# Patient Record
Sex: FEMALE | Race: WHITE | URBAN - METROPOLITAN AREA
[De-identification: names, ages, dates, MRNs, and addresses within clinical notes are randomized per-mention and may not be internally consistent; named-entity substitution may affect disease eponyms.]

---

## 2017-01-14 DIAGNOSIS — E03.9 ACQUIRED HYPOTHYROIDISM: Primary | Chronic | ICD-10-CM

## 2017-01-17 RX ORDER — LEVOTHYROXINE SODIUM 88 MCG
TABLET ORAL
Qty: 30 TABLET | Refills: 0 | Status: SHIPPED | OUTPATIENT
Start: 2017-01-17 | End: 2017-02-15

## 2017-01-17 NOTE — TELEPHONE ENCOUNTER
Patients last labs on 11/16/16, Thyroid dose lowered  Too repeat labs in 8 weeks  Due now to repeat labs

## 2017-02-06 ENCOUNTER — TELEPHONE (OUTPATIENT)
Dept: FAMILY MEDICINE CLINIC | Facility: CLINIC | Age: 62
End: 2017-02-06

## 2017-02-06 ENCOUNTER — HOSPITAL ENCOUNTER (OUTPATIENT)
Dept: GENERAL RADIOLOGY | Age: 62
Discharge: HOME | End: 2017-02-06
Attending: FAMILY MEDICINE
Payer: COMMERCIAL

## 2017-02-06 ENCOUNTER — OFFICE VISIT (OUTPATIENT)
Dept: FAMILY MEDICINE CLINIC | Facility: CLINIC | Age: 62
End: 2017-02-06

## 2017-02-06 VITALS
WEIGHT: 163 LBS | SYSTOLIC BLOOD PRESSURE: 118 MMHG | HEIGHT: 65 IN | RESPIRATION RATE: 16 BRPM | HEART RATE: 64 BPM | TEMPERATURE: 98 F | BODY MASS INDEX: 27.16 KG/M2 | DIASTOLIC BLOOD PRESSURE: 76 MMHG

## 2017-02-06 DIAGNOSIS — J01.01 ACUTE RECURRENT MAXILLARY SINUSITIS: ICD-10-CM

## 2017-02-06 DIAGNOSIS — E89.0 HX OF THYROIDECTOMY: ICD-10-CM

## 2017-02-06 DIAGNOSIS — D84.9 IMMUNOCOMPROMISED (HCC): ICD-10-CM

## 2017-02-06 DIAGNOSIS — J20.9 ACUTE BRONCHITIS WITH BRONCHOSPASM: ICD-10-CM

## 2017-02-06 DIAGNOSIS — B96.89 ACUTE BACTERIAL PHARYNGITIS: ICD-10-CM

## 2017-02-06 DIAGNOSIS — J02.8 ACUTE BACTERIAL PHARYNGITIS: ICD-10-CM

## 2017-02-06 DIAGNOSIS — D05.12 DUCTAL CARCINOMA IN SITU (DCIS) OF LEFT BREAST: ICD-10-CM

## 2017-02-06 DIAGNOSIS — J20.9 ACUTE BRONCHITIS WITH BRONCHOSPASM: Primary | ICD-10-CM

## 2017-02-06 PROCEDURE — 71020 XR CHEST PA + LAT CHEST (CPT=71020): CPT

## 2017-02-06 PROCEDURE — 99213 OFFICE O/P EST LOW 20 MIN: CPT | Performed by: FAMILY MEDICINE

## 2017-02-06 RX ORDER — AZITHROMYCIN 250 MG/1
TABLET, FILM COATED ORAL
Qty: 6 TABLET | Refills: 1 | Status: SHIPPED | OUTPATIENT
Start: 2017-02-06 | End: 2017-05-16 | Stop reason: ALTCHOICE

## 2017-02-06 RX ORDER — METHYLPREDNISOLONE 4 MG/1
TABLET ORAL
Qty: 1 KIT | Refills: 0 | Status: SHIPPED | OUTPATIENT
Start: 2017-02-06 | End: 2017-02-08 | Stop reason: SINTOL

## 2017-02-06 NOTE — PROGRESS NOTES
Pt here for sick visit today. 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 She states was sick in December with sinus infection and got better and got sick again now.  Fe 98 °F (36.7 °C)  Resp 16  Ht 65\"  Wt 163 lb  BMI 27.12 kg/m2  GENERAL: well developed, well nourished,in no apparent distress  SKIN: no rashes,no suspicious lesions  EYES:PERRLA, EOMI, normal optic disk,conjunctiva are clear  HEENT: atraumatic, normocepha management  -     XR CHEST PA + LAT CHEST (CPT=71020); Future  -     azithromycin (ZITHROMAX Z-CRISSY) 250 MG Oral Tab; Take two tablets by mouth today, then one tablet daily. -     methylPREDNISolone (MEDROL) 4 MG Oral Tablet Therapy Pack; As directed.     H

## 2017-02-06 NOTE — PROGRESS NOTES
Quick Note:    Please call pt with STAT chest xray result--no pneumonia but she has clinical sinus infection and bronchitis and advise she complete the ZPack and Medrol Dosepack as ordered- Dr. Annette Brody  ______

## 2017-02-06 NOTE — TELEPHONE ENCOUNTER
Stat chest xray notice received and resulted as normal and message left for staff to call pt.  Dr. Tootie Griffin

## 2017-02-06 NOTE — PATIENT INSTRUCTIONS
Dena you were seen for sinus infection and bronchitis --start the Zpack as directed with a the Medrol Dosepack as directed. Rest and hydrate. Use Tylenol as needed for fevers or headaches.    Take care, Dr. Irish Kemp heal bronchial tubes. Most of the time, acute bronchitis is caused by a viral infection. Antibiotics are usually not prescribed for viral infections. · Drink plenty of fluids, such as water, juice, or warm soup. Fluids loosen mucus so that you can cough it up. mucus and other fluids. Tiny hairlike cilia cover the mucosa. Cilia help carry mucus toward the opening of the sinus. Too much mucus may cause the cilia to stop working. This blocks the sinus opening.  A buildup of fluid in the sinuses then causes pain and

## 2017-02-08 ENCOUNTER — TELEPHONE (OUTPATIENT)
Dept: FAMILY MEDICINE CLINIC | Facility: CLINIC | Age: 62
End: 2017-02-08

## 2017-02-08 NOTE — TELEPHONE ENCOUNTER
Started the Z-pack and Medrol dose pack on Monday evening, noticed her face getting red and flushed, no itching or hives. She thinks it is from the steroid, she has taken a Z-pack in the past with no problems.  She did not take the prednisone today and want

## 2017-02-08 NOTE — TELEPHONE ENCOUNTER
Call pt and advise the red face is from the steroids and not the Zpack. She is to finish the Zpack. Steroids due cause flushing. She can hold the steroid and switch to Ibuprofen to help with inflammation instead.  Dr. Matthew Pacheco

## 2017-02-15 RX ORDER — LEVOTHYROXINE SODIUM 88 MCG
TABLET ORAL
Qty: 15 TABLET | Refills: 0 | Status: SHIPPED | OUTPATIENT
Start: 2017-02-15 | End: 2017-03-05

## 2017-03-06 NOTE — TELEPHONE ENCOUNTER
Patient is over  due for labs, please call patient to have her labs completed for thyroid before we can give out any more refills on medication.

## 2017-03-08 ENCOUNTER — APPOINTMENT (OUTPATIENT)
Dept: LAB | Age: 62
End: 2017-03-08
Attending: FAMILY MEDICINE
Payer: COMMERCIAL

## 2017-03-08 DIAGNOSIS — E03.9 ACQUIRED HYPOTHYROIDISM: Chronic | ICD-10-CM

## 2017-03-08 LAB
ANTI-THYROGLOBULIN: 16 U/ML (ref ?–60)
ANTI-THYROPEROXIDASE: <28 U/ML (ref ?–60)
FREE T4: 1.1 NG/DL (ref 0.9–1.8)
TSI SER-ACNC: 1.03 MIU/ML (ref 0.35–5.5)

## 2017-03-08 PROCEDURE — 84443 ASSAY THYROID STIM HORMONE: CPT

## 2017-03-08 PROCEDURE — 86376 MICROSOMAL ANTIBODY EACH: CPT

## 2017-03-08 PROCEDURE — 36415 COLL VENOUS BLD VENIPUNCTURE: CPT

## 2017-03-08 PROCEDURE — 84439 ASSAY OF FREE THYROXINE: CPT

## 2017-03-08 PROCEDURE — 86800 THYROGLOBULIN ANTIBODY: CPT

## 2017-03-09 RX ORDER — LEVOTHYROXINE SODIUM 88 MCG
TABLET ORAL
Qty: 90 TABLET | Refills: 1 | Status: SHIPPED | OUTPATIENT
Start: 2017-03-09 | End: 2017-05-16

## 2017-03-09 NOTE — PROGRESS NOTES
Quick Note:    Please call pt with normal results of thyroid labs and thyroid antibodies and repeat as directed in 6 months--- Dr. Andrew Lozano  ______

## 2017-05-16 ENCOUNTER — OFFICE VISIT (OUTPATIENT)
Dept: FAMILY MEDICINE CLINIC | Facility: CLINIC | Age: 62
End: 2017-05-16

## 2017-05-16 VITALS
BODY MASS INDEX: 26.89 KG/M2 | DIASTOLIC BLOOD PRESSURE: 82 MMHG | RESPIRATION RATE: 14 BRPM | HEIGHT: 65 IN | SYSTOLIC BLOOD PRESSURE: 126 MMHG | HEART RATE: 68 BPM | TEMPERATURE: 98 F | WEIGHT: 161.38 LBS

## 2017-05-16 DIAGNOSIS — E03.9 ACQUIRED HYPOTHYROIDISM: Primary | Chronic | ICD-10-CM

## 2017-05-16 DIAGNOSIS — Z76.0 MEDICATION REFILL: ICD-10-CM

## 2017-05-16 DIAGNOSIS — G43.709 CHRONIC MIGRAINE WITHOUT AURA WITHOUT STATUS MIGRAINOSUS, NOT INTRACTABLE: ICD-10-CM

## 2017-05-16 DIAGNOSIS — E89.0 HX OF THYROIDECTOMY: ICD-10-CM

## 2017-05-16 PROCEDURE — 99213 OFFICE O/P EST LOW 20 MIN: CPT | Performed by: FAMILY MEDICINE

## 2017-05-16 RX ORDER — ELETRIPTAN HYDROBROMIDE 40 MG/1
40 TABLET, FILM COATED ORAL DAILY
Qty: 27 TABLET | Refills: 3 | Status: SHIPPED | OUTPATIENT
Start: 2017-05-16 | End: 2017-11-14

## 2017-05-16 RX ORDER — LEVOTHYROXINE SODIUM 88 MCG
TABLET ORAL
Qty: 90 TABLET | Refills: 1 | Status: SHIPPED | OUTPATIENT
Start: 2017-05-16 | End: 2017-11-14

## 2017-05-16 NOTE — PATIENT INSTRUCTIONS
Rebeca Olivarez you were seen for thyroid and migraine management. Return for CPE and you are due for colonoscopy and possible colo cards. Thyroid labs due 9/2017. Stay on current meds. Take care, Dr. Deborah Hidalgo    Hypothyroidism       You have hypothyroidism.  Ceasar Carr calcium or antacids within 4 hours of taking your thyroid hormone pills.   · Don’t take other medicines with your thyroid hormone pill without checking with your provider first.  · Tell your provider if you have any side effects from your medicines that bot

## 2017-05-16 NOTE — PROGRESS NOTES
Berto Schulz is here for management of secondary hypothyroidism with hx of partial thyroidectomy and also for migraines. She needs meds refilled.  She also let me know she just went to [facility] 5/8/17 and had her mammogram done and normal. She brought in the no (Oral)  Resp 14  Ht 65\"  Wt 161 lb 6.4 oz  BMI 26.86 kg/m2  GENERAL: well developed, well nourished,in no apparent distress, very pleasant white female  SKIN: no rashes,no suspicious lesions  HEENT: atraumatic, normocephalic,ears and throat are clear  NEC

## 2017-07-17 ENCOUNTER — HOSPITAL ENCOUNTER (OUTPATIENT)
Dept: GENERAL RADIOLOGY | Age: 62
Discharge: HOME | End: 2017-07-17
Attending: FAMILY MEDICINE
Payer: COMMERCIAL

## 2017-07-17 ENCOUNTER — OFFICE VISIT (OUTPATIENT)
Dept: FAMILY MEDICINE CLINIC | Facility: CLINIC | Age: 62
End: 2017-07-17

## 2017-07-17 VITALS
OXYGEN SATURATION: 99 % | HEART RATE: 60 BPM | RESPIRATION RATE: 16 BRPM | DIASTOLIC BLOOD PRESSURE: 76 MMHG | SYSTOLIC BLOOD PRESSURE: 114 MMHG | BODY MASS INDEX: 26.82 KG/M2 | HEIGHT: 65 IN | WEIGHT: 161 LBS

## 2017-07-17 DIAGNOSIS — L60.8 BLACK NAILS: ICD-10-CM

## 2017-07-17 DIAGNOSIS — R23.2 HOT FLASHES: ICD-10-CM

## 2017-07-17 DIAGNOSIS — M76.892 TENDONITIS INVOLVING LEFT HIP ABDUCTORS: ICD-10-CM

## 2017-07-17 DIAGNOSIS — S69.91XS: ICD-10-CM

## 2017-07-17 DIAGNOSIS — M25.552 ACUTE HIP PAIN, LEFT: ICD-10-CM

## 2017-07-17 DIAGNOSIS — G62.9 PERIPHERAL POLYNEUROPATHY: ICD-10-CM

## 2017-07-17 DIAGNOSIS — F41.0 PANIC DISORDER: ICD-10-CM

## 2017-07-17 DIAGNOSIS — M25.552 ACUTE HIP PAIN, LEFT: Primary | ICD-10-CM

## 2017-07-17 DIAGNOSIS — Z51.81 ENCOUNTER FOR THERAPEUTIC DRUG MONITORING: ICD-10-CM

## 2017-07-17 DIAGNOSIS — D05.12 DUCTAL CARCINOMA IN SITU (DCIS) OF LEFT BREAST: ICD-10-CM

## 2017-07-17 PROCEDURE — 99214 OFFICE O/P EST MOD 30 MIN: CPT | Performed by: FAMILY MEDICINE

## 2017-07-17 PROCEDURE — 73502 X-RAY EXAM HIP UNI 2-3 VIEWS: CPT | Performed by: FAMILY MEDICINE

## 2017-07-17 RX ORDER — METHYLPREDNISOLONE 4 MG/1
TABLET ORAL
Qty: 1 KIT | Refills: 0 | Status: SHIPPED | OUTPATIENT
Start: 2017-07-17 | End: 2017-11-14 | Stop reason: ALTCHOICE

## 2017-07-17 RX ORDER — ALPRAZOLAM 0.25 MG/1
0.25 TABLET ORAL NIGHTLY PRN
Qty: 90 TABLET | Refills: 0 | Status: SHIPPED | OUTPATIENT
Start: 2017-07-17 | End: 2018-11-16

## 2017-07-17 NOTE — PATIENT INSTRUCTIONS
Sherryle Griffiths you were seen for left hip pain and tendonitis after travel and hiking. Try the Medrol Dosepack to help symptoms. Start the PT to help and get xrays done of left hip and pelvis as well.     Regarding the panic and hot flashes you can use your Xanax priti pain.  Follow-up care  Follow up with your healthcare provider, or as advised. If your symptoms do not begin to get better after a week, more tests may be needed. If X-rays were taken, you will be told of any new findings that may affect your care.   When do during a panic attack  · Remind yourself that your body is having a false alarm. Nothing bad will happen to you. You’ve survived attacks before, and you will this time, too. · Try not to think frightening thoughts about what might happen.  You won’t die breath or smothering  · Feelings of choking  · Chest pain or discomfort  · Nausea or abdominal distress  · Feeling dizzy, unsteady, light-headed, or faint  · Numbness or tingling sensations  · Fear of dying  · Fear of going crazy or of losing control  · Fe certain techniques that are helpful: relaxation and breathing exercises, visualization, biofeedback, meditation or simply taking some time-out to clear your mind.  For more information about this, ask your doctor or go to a local bookstore and review the ma

## 2017-07-17 NOTE — PROGRESS NOTES
Raphael Tenorio is a 63year old female. HPI:   Patient is here with a list of multiple issues today. #1.   She states she recently hiked the [facility] June 14-17 and when got home noticed left hip pains and now cannot adduct left leg and adrián GENERAL HEALTH: Patient here to address multiple issues today as noted above.   See HPI for details  SKIN: denies any unusual skin lesions or rashes; patient describes a rotten lateral right thumbnail since her chemotherapy that will not heal and she wish currently stable  PSYCHE: Patient is not depressed PHQ 2 score is 0. Instead, she is anxious and has had panic recently.   Xanax 0.25 mg advise daily and a new prescription will be issued    ASSESSMENT AND PLAN:   Trenton Mckenna was seen for multiple issues as polyneuropathy (HCC)  -     XR HIP + PELVIS MIN 4 VIEWS LEFT (CPT=73503); Future  Patient states she has had a long standing history of polyneuropathy in her extremities. She may need to see neurology in the future.   Currently we will evaluate her left hi

## 2017-07-19 ENCOUNTER — TELEPHONE (OUTPATIENT)
Dept: FAMILY MEDICINE CLINIC | Facility: CLINIC | Age: 62
End: 2017-07-19

## 2017-07-19 ENCOUNTER — OFFICE VISIT (OUTPATIENT)
Dept: PHYSICAL THERAPY | Age: 62
End: 2017-07-19
Attending: FAMILY MEDICINE
Payer: COMMERCIAL

## 2017-07-19 DIAGNOSIS — M25.552 ACUTE HIP PAIN, LEFT: ICD-10-CM

## 2017-07-19 DIAGNOSIS — M76.892 TENDONITIS INVOLVING LEFT HIP ABDUCTORS: ICD-10-CM

## 2017-07-19 DIAGNOSIS — G62.9 PERIPHERAL POLYNEUROPATHY: ICD-10-CM

## 2017-07-19 PROCEDURE — 97162 PT EVAL MOD COMPLEX 30 MIN: CPT

## 2017-07-19 PROCEDURE — 97110 THERAPEUTIC EXERCISES: CPT

## 2017-07-19 NOTE — TELEPHONE ENCOUNTER
Called autumn and clarified PT order for more of na orthopedic hip pain. Pt is scheduled for an evaluation tomorrow.

## 2017-07-19 NOTE — TELEPHONE ENCOUNTER
Saray Charles calling to see if patient should be seen at physical therapy for [facility] or orthopaedic. Calling to see if there is a pelvic floor component for patient to work on.  Please rebecca foster for PT.

## 2017-07-19 NOTE — PROGRESS NOTES
LOWER EXTREMITY EVALUATION:   Referring Physician: Dr. Paras Ibarra  Diagnosis: Acute pain L hip, tendinitis L hip     Date of Service: 7/19/2017     PATIENT Haydee Triplett is a 63year old y/o female who presents to therapy today with complaint SLR R and L     Balance: SLS: R 20 sec, L 25 sec    Today’s Treatment and Response: Instructed hooklying hip flexion, BKFO, quad sets, hip IR and ER reps as tolerated. Discussed findings and plan of care.  Patient was advised that there should be no lastin certify the need for these services furnished under this plan of treatment and while under my care.     X___________________________________________________ Date____________________    Certification From: 3/58/4736  To:10/17/2017

## 2017-07-21 NOTE — PROGRESS NOTES
Please call pt with normal results of her xray of her left  hip and pelvis with mild osteoarthritis seen and no metastatic concerns with her breast cancer history.  She should do the physical therapy as ordered --  Dr. Tylor Romero

## 2017-07-24 ENCOUNTER — OFFICE VISIT (OUTPATIENT)
Dept: PHYSICAL THERAPY | Age: 62
End: 2017-07-24
Attending: FAMILY MEDICINE
Payer: COMMERCIAL

## 2017-07-24 PROCEDURE — 97140 MANUAL THERAPY 1/> REGIONS: CPT

## 2017-07-24 PROCEDURE — 97110 THERAPEUTIC EXERCISES: CPT

## 2017-07-24 NOTE — PROGRESS NOTES
Dx: Acute pain L hip, L hip tendinitis         Authorized # of Visits: Will see for 12 visits         Next MD visit: none scheduled  Fall Risk: standard         Precautions: n/a             Subjective: L hip is getting  pain from L hip to groin.  Pain leve min  Total Treatment Time: 45 min

## 2017-07-26 ENCOUNTER — OFFICE VISIT (OUTPATIENT)
Dept: PHYSICAL THERAPY | Age: 62
End: 2017-07-26
Attending: FAMILY MEDICINE
Payer: COMMERCIAL

## 2017-07-26 PROCEDURE — 97140 MANUAL THERAPY 1/> REGIONS: CPT

## 2017-07-26 PROCEDURE — 97110 THERAPEUTIC EXERCISES: CPT

## 2017-07-26 NOTE — PROGRESS NOTES
Dx: Acute pain L hip, L hip tendinitis         Authorized # of Visits: Will see for 12 visits         Next MD visit: none scheduled  Fall Risk: standard         Precautions: n/a             Subjective: L hip was sore after treatment.  Better now and has re ability to walk with decreased discomfort     Charges:  Thera EX 2 (25 min) Man The 1 (13 min)       Total Timed Treatment: 38 min  Total Treatment Time: 45 min

## 2017-08-07 ENCOUNTER — OFFICE VISIT (OUTPATIENT)
Dept: PHYSICAL THERAPY | Age: 62
End: 2017-08-07
Attending: FAMILY MEDICINE
Payer: COMMERCIAL

## 2017-08-07 PROCEDURE — 97140 MANUAL THERAPY 1/> REGIONS: CPT

## 2017-08-07 PROCEDURE — 97110 THERAPEUTIC EXERCISES: CPT

## 2017-08-07 NOTE — PROGRESS NOTES
Dx: Acute pain L hip, L hip tendinitis         Authorized # of Visits: Will see for 12 visits         Next MD visit: none scheduled  Fall Risk: standard         Precautions: n/a             Subjective: Patient states that she is doing a little bit better. mobilization all planes 10 min        STM L anterior hip muscles 3 min Instructed with hip flexor stretch and Ladan stretch 3 min HEP                Skilled Services: hip mobilization to increase pain-free hip ROM for LE dressing, balance and strengthening

## 2017-08-09 ENCOUNTER — OFFICE VISIT (OUTPATIENT)
Dept: PHYSICAL THERAPY | Age: 62
End: 2017-08-09
Attending: FAMILY MEDICINE
Payer: COMMERCIAL

## 2017-08-09 PROCEDURE — 97140 MANUAL THERAPY 1/> REGIONS: CPT

## 2017-08-09 PROCEDURE — 97110 THERAPEUTIC EXERCISES: CPT

## 2017-08-09 NOTE — PROGRESS NOTES
Dx: Acute pain L hip, L hip tendinitis         Authorized # of Visits: Will see for 12 visits         Next MD visit: none scheduled  Fall Risk: standard         Precautions: n/a             Subjective: Patient states that she continues to feel better.  Sta walk F/B/L 3X each black tband       DKTCS with ball 10X DKTCS with ball 10X L hip mobilization all planes 13 min Bridging with hams curls 10X      L hip mobilization all planes 10 min L hip mobilization all planes 10 min  L hip mobilization all planes 13

## 2017-08-14 ENCOUNTER — OFFICE VISIT (OUTPATIENT)
Dept: PHYSICAL THERAPY | Age: 62
End: 2017-08-14
Attending: FAMILY MEDICINE
Payer: COMMERCIAL

## 2017-08-14 PROCEDURE — 97110 THERAPEUTIC EXERCISES: CPT

## 2017-08-14 NOTE — PROGRESS NOTES
Dx: Acute pain L hip, L hip tendinitis         Authorized # of Visits:   Will see for 12 visits         Next MD visit: none scheduled  Fall Risk: standard         Precautions: n/a           Physical therapy discharge summary:   Subjective: Patient states th balance 10X SLS on airex balance 10X eyes closed SLS on airex balance 10X eyes closed SLS on airex balance 10X eyes closed   ASU BOSU 20X Standing hip 3 ways 30X black theraband Standing hip 3 ways 20X black theraband Standing hip 3 ways 20X black theraban

## 2017-08-16 ENCOUNTER — APPOINTMENT (OUTPATIENT)
Dept: PHYSICAL THERAPY | Age: 62
End: 2017-08-16
Payer: COMMERCIAL

## 2017-10-19 ENCOUNTER — LAB ENCOUNTER (OUTPATIENT)
Dept: LAB | Age: 62
End: 2017-10-19
Attending: FAMILY MEDICINE
Payer: COMMERCIAL

## 2017-10-19 DIAGNOSIS — E03.9 ACQUIRED HYPOTHYROIDISM: Chronic | ICD-10-CM

## 2017-10-19 PROCEDURE — 84439 ASSAY OF FREE THYROXINE: CPT

## 2017-10-19 PROCEDURE — 84443 ASSAY THYROID STIM HORMONE: CPT

## 2017-10-19 PROCEDURE — 36415 COLL VENOUS BLD VENIPUNCTURE: CPT

## 2017-11-02 NOTE — PROGRESS NOTES
Please call pt with normal results of thyroid labs and stay on current dose of thyroid meds and repeat as directed these labs in 6 months ---  Dr. Thompson Going

## 2017-11-03 DIAGNOSIS — E03.9 ACQUIRED HYPOTHYROIDISM: Primary | Chronic | ICD-10-CM

## 2017-11-14 ENCOUNTER — OFFICE VISIT (OUTPATIENT)
Dept: FAMILY MEDICINE CLINIC | Facility: CLINIC | Age: 62
End: 2017-11-14

## 2017-11-14 VITALS
WEIGHT: 151 LBS | DIASTOLIC BLOOD PRESSURE: 74 MMHG | BODY MASS INDEX: 25.16 KG/M2 | SYSTOLIC BLOOD PRESSURE: 116 MMHG | OXYGEN SATURATION: 99 % | HEART RATE: 76 BPM | HEIGHT: 65 IN | RESPIRATION RATE: 16 BRPM | TEMPERATURE: 98 F

## 2017-11-14 DIAGNOSIS — Z76.0 MEDICATION REFILL: ICD-10-CM

## 2017-11-14 DIAGNOSIS — E03.9 ACQUIRED HYPOTHYROIDISM: Chronic | ICD-10-CM

## 2017-11-14 DIAGNOSIS — Z23 NEED FOR DIPHTHERIA-TETANUS-PERTUSSIS (TDAP) VACCINE: ICD-10-CM

## 2017-11-14 DIAGNOSIS — E89.0 HX OF THYROIDECTOMY: ICD-10-CM

## 2017-11-14 DIAGNOSIS — Z23 NEEDS FLU SHOT: ICD-10-CM

## 2017-11-14 DIAGNOSIS — Z12.11 COLON CANCER SCREENING: ICD-10-CM

## 2017-11-14 DIAGNOSIS — G43.709 CHRONIC MIGRAINE WITHOUT AURA WITHOUT STATUS MIGRAINOSUS, NOT INTRACTABLE: ICD-10-CM

## 2017-11-14 DIAGNOSIS — Z13.31 DEPRESSION SCREEN: ICD-10-CM

## 2017-11-14 DIAGNOSIS — Z71.3 DIETARY COUNSELING: ICD-10-CM

## 2017-11-14 DIAGNOSIS — D05.12 DUCTAL CARCINOMA IN SITU (DCIS) OF LEFT BREAST: ICD-10-CM

## 2017-11-14 DIAGNOSIS — Z71.82 EXERCISE COUNSELING: ICD-10-CM

## 2017-11-14 DIAGNOSIS — Z00.00 ROUTINE GENERAL MEDICAL EXAMINATION AT A HEALTH CARE FACILITY: Primary | ICD-10-CM

## 2017-11-14 PROCEDURE — 90472 IMMUNIZATION ADMIN EACH ADD: CPT | Performed by: FAMILY MEDICINE

## 2017-11-14 PROCEDURE — 90686 IIV4 VACC NO PRSV 0.5 ML IM: CPT | Performed by: FAMILY MEDICINE

## 2017-11-14 PROCEDURE — 99396 PREV VISIT EST AGE 40-64: CPT | Performed by: FAMILY MEDICINE

## 2017-11-14 PROCEDURE — 81003 URINALYSIS AUTO W/O SCOPE: CPT | Performed by: FAMILY MEDICINE

## 2017-11-14 PROCEDURE — 90715 TDAP VACCINE 7 YRS/> IM: CPT | Performed by: FAMILY MEDICINE

## 2017-11-14 PROCEDURE — 90471 IMMUNIZATION ADMIN: CPT | Performed by: FAMILY MEDICINE

## 2017-11-14 RX ORDER — LEVOTHYROXINE SODIUM 88 MCG
TABLET ORAL
Qty: 90 TABLET | Refills: 1 | Status: SHIPPED | OUTPATIENT
Start: 2017-11-14 | End: 2018-05-21

## 2017-11-14 RX ORDER — ELETRIPTAN HYDROBROMIDE 40 MG/1
40 TABLET, FILM COATED ORAL DAILY
Qty: 27 TABLET | Refills: 3 | Status: SHIPPED | OUTPATIENT
Start: 2017-11-14 | End: 2018-05-13

## 2017-11-14 NOTE — PATIENT INSTRUCTIONS
You were seen today for your annual flu shot and 10 yr Tdap booster and physical. Please continue healthy habits with your diet and exercise. Wear sunscreen as needed and insect repellant when needed. See a dentist and eye doctor regularly.   See your spe stored as fat. Your health care provider can help you create a diet plan to manage your calories. This will likely include eating healthier foods as well as exercising regularly.  To help you track your progress, keep a diary to record what you eat and how tips:  · Remove fat from meat and skin from poultry before cooking. · Skim fat from the surface of soups and sauces. · Broil, boil, bake, steam, grill, and microwave food without added fats.   · Choose ingredients that spice up your food without adding ca age group Flexible sigmoidoscopy every 5 years, or colonoscopy every 10 years, or double-contrast barium enema every 5 years; yearly fecal occult blood test or fecal immunochemical test; or a stool DNA test as often as your health care provider advises; ta (HIB) Women at increased risk for infection – talk with your healthcare provider 1 to 3 doses   Influenza (flu) All women in this age group Once a year   Measles, mumps, rubella (MMR) Women in this age group through their late 46s who have no record of the substitute for professional medical care. Always follow your healthcare professional's instructions. Specific Vaccine Information  The CDC provides detailed information in multiple languages on all vaccines currently available.   Because information

## 2017-11-14 NOTE — PROGRESS NOTES
HPI:   Uyen Reyna is a 63year old female who presents for a complete physical exam and no pap. Pt feeling well today. Dentist and eye doctors--seen dentist and not eye doctor yet;  Diet -healthy and trying to keep off 10 pound loss;     Exercise History:  1960: APPENDECTOMY      Comment: [facility] in [place]  2011: HYSTERECTOMY  2012: LUMPECTOMY LEFT  1992: THYROIDECTOMY   Family History   Problem Relation Age of Onset   • Heart Disease Father 78   • Asthma Father 78   • alzheimers [Other] [OTHER] Mo murmur, normal S1 and S2  VASCULAR: No pedal edema. GI: Abdomen is soft, nontender, no HSM. Normal BS's in all quadrants, no masses.   Samantha Erazo sees gyne for paps   MUSCULOSKELETAL: Gait is normal, spine straight, normal ROM in all joints, no joint defor every 6 months advised and stay on current meds    Needs flu shot  -     FLULAVAL INFLUENZA VACCINE QUAD PRESERVATIVE FREE 0.5 ML    Need for diphtheria-tetanus-pertussis (Tdap) vaccine  -     TETANUS, DIPHTHERIA TOXOIDS AND ACELLULAR PERTUSIS VACCINE (TDA

## 2017-11-14 NOTE — TELEPHONE ENCOUNTER
RELPAX 40 MG Oral Tab  Take 1 tablet (40 mg total) by mouth daily.        Disp: 27 tablet Refills: 3 CHRISTIANO    Class: Normal Start: 11/14/2017 - 5/13/2018   For: Chronic migraine without aura without status migrainosus, not intractable; Medication refill  Docu

## 2017-12-21 ENCOUNTER — OFFICE VISIT (OUTPATIENT)
Dept: FAMILY MEDICINE CLINIC | Facility: CLINIC | Age: 62
End: 2017-12-21

## 2017-12-21 VITALS
DIASTOLIC BLOOD PRESSURE: 68 MMHG | WEIGHT: 149 LBS | BODY MASS INDEX: 25 KG/M2 | RESPIRATION RATE: 12 BRPM | SYSTOLIC BLOOD PRESSURE: 120 MMHG | OXYGEN SATURATION: 100 % | HEART RATE: 67 BPM | TEMPERATURE: 98 F

## 2017-12-21 DIAGNOSIS — J02.9 SORE THROAT: Primary | ICD-10-CM

## 2017-12-21 DIAGNOSIS — R05.9 COUGH: ICD-10-CM

## 2017-12-21 LAB
CONTROL LINE PRESENT WITH A CLEAR BACKGROUND (YES/NO): YES YES/NO
KIT EXPIRATION DATE: NORMAL DATE
STREP GRP A CUL-SCR: NEGATIVE

## 2017-12-21 PROCEDURE — 99213 OFFICE O/P EST LOW 20 MIN: CPT | Performed by: FAMILY MEDICINE

## 2017-12-21 PROCEDURE — 87880 STREP A ASSAY W/OPTIC: CPT | Performed by: FAMILY MEDICINE

## 2017-12-21 RX ORDER — AZITHROMYCIN 250 MG/1
TABLET, FILM COATED ORAL
Qty: 6 TABLET | Refills: 0 | Status: SHIPPED | OUTPATIENT
Start: 2017-12-21 | End: 2018-03-13 | Stop reason: ALTCHOICE

## 2018-01-02 NOTE — PROGRESS NOTES
HPI:    Patient ID: Beryle Im is a 63year old female. HPI  Patient is here with complaint of sore throat and ear pressure for the past several days. No other symptoms. No fever.   Review of Systems  Negative except for the above       Curren Referrals:  None       NU#[id number]

## 2018-05-21 ENCOUNTER — TELEPHONE (OUTPATIENT)
Dept: FAMILY MEDICINE CLINIC | Facility: CLINIC | Age: 63
End: 2018-05-21

## 2018-05-21 DIAGNOSIS — E89.0 HX OF THYROIDECTOMY: ICD-10-CM

## 2018-05-21 DIAGNOSIS — E03.9 ACQUIRED HYPOTHYROIDISM: Chronic | ICD-10-CM

## 2018-05-21 DIAGNOSIS — Z76.0 MEDICATION REFILL: ICD-10-CM

## 2018-05-21 RX ORDER — LEVOTHYROXINE SODIUM 88 MCG
TABLET ORAL
Qty: 30 TABLET | Refills: 0 | Status: SHIPPED | OUTPATIENT
Start: 2018-05-21 | End: 2018-06-20

## 2018-05-21 NOTE — TELEPHONE ENCOUNTER
Requesting Synthroid  LOV: 12/21/17  RTC: not noted  Last Relevant Labs: 10/19/17  Filled: 11/14/17 #90 with 1 refills    Future Appointments  Date Time Provider Howard Saez   6/6/2018 2:30 PM Priya Montiel, DO [facility]       Has new pc

## 2018-06-06 ENCOUNTER — OFFICE VISIT (OUTPATIENT)
Dept: FAMILY MEDICINE CLINIC | Facility: CLINIC | Age: 63
End: 2018-06-06

## 2018-06-06 VITALS
DIASTOLIC BLOOD PRESSURE: 72 MMHG | HEART RATE: 80 BPM | BODY MASS INDEX: 27.34 KG/M2 | SYSTOLIC BLOOD PRESSURE: 118 MMHG | HEIGHT: 62.6 IN | WEIGHT: 152.38 LBS

## 2018-06-06 DIAGNOSIS — R53.83 FATIGUE, UNSPECIFIED TYPE: Primary | ICD-10-CM

## 2018-06-06 DIAGNOSIS — E03.9 ACQUIRED HYPOTHYROIDISM: Chronic | ICD-10-CM

## 2018-06-06 DIAGNOSIS — Z13.21 SCREENING FOR MALNUTRITION: ICD-10-CM

## 2018-06-06 DIAGNOSIS — F51.04 PSYCHOPHYSIOLOGICAL INSOMNIA: ICD-10-CM

## 2018-06-06 PROCEDURE — 99214 OFFICE O/P EST MOD 30 MIN: CPT | Performed by: FAMILY MEDICINE

## 2018-06-06 RX ORDER — ELETRIPTAN HYDROBROMIDE 40 MG/1
40 TABLET, FILM COATED ORAL AS NEEDED
COMMUNITY
End: 2018-06-06

## 2018-06-06 NOTE — PROGRESS NOTES
Briana Carlton is a 63year old female. HPI:     Fatigue:  x3-4 months.  h/o anemia. Feels tired all the time. Takes an hour to fall asleep, wakes and difficulty falling back asleep. Thinks she gets about 5 hours a night. Voids twice a night. rashes   RESPIRATORY: Denies: TOMASZ/ROSARIO/Cough/Wheeze/Orthopnea/PND  CARDIOVASCULAR: Denies CP/palpitations/ROSARIO  VASCULAR: Denies edema, denies claudication type symptoms, varicose veins  GI: Denies abdominal pain/nausea/vomiting/blood in stool/black stool/bl Future  - IRON AND TIBC; Future  - FERRITIN; Future    2. Psychophysiological insomnia  Patient counseled on sleep hygiene. Okay to try OTC sleep aid. 3. Acquired hypothyroidism  Recheck thyroid function test.  Continue Synthroid 88 mcg daily for now.

## 2018-06-06 NOTE — PATIENT INSTRUCTIONS
Sleep and Women: Life Stages  A woman goes through many stages during her lifetime. These stages are a natural part of being a woman. Physical and emotional changes take place during the menstrual cycle, pregnancy, motherhood, and menopause.  These change mothers feel a little down for a few weeks. Share your feelings with your loved ones. Talk to your healthcare provider if your feelings get in the way of sleeping or eating. · Try to adjust your baby’s sleep to fit a day-night cycle.  At night, have lights time.  What causes insomnia? Some common causes of insomnia are:  · Health problems. These may be things such as pain, depression, medicine side effects, or trouble breathing. · Circadian rhythm disorder.  This is a shift in the body’s normal 24-hour acti day.  · Exercise regularly. It may help you reduce stress. Avoid strenuous exercise for 2 to 4 hours before bedtime. · Avoid or limit naps, especially in the late afternoon. · Use your bed only for sleep and sex.   · Don’t spend too much time in bed tryin rights reserved. This information is not intended as a substitute for professional medical care. Always follow your healthcare professional's instructions.

## 2018-06-08 ENCOUNTER — LAB ENCOUNTER (OUTPATIENT)
Dept: LAB | Age: 63
End: 2018-06-08
Attending: FAMILY MEDICINE
Payer: COMMERCIAL

## 2018-06-08 ENCOUNTER — TELEPHONE (OUTPATIENT)
Dept: FAMILY MEDICINE CLINIC | Facility: CLINIC | Age: 63
End: 2018-06-08

## 2018-06-08 DIAGNOSIS — Z13.21 SCREENING FOR MALNUTRITION: ICD-10-CM

## 2018-06-08 DIAGNOSIS — E03.9 ACQUIRED HYPOTHYROIDISM: Chronic | ICD-10-CM

## 2018-06-08 DIAGNOSIS — R53.83 FATIGUE, UNSPECIFIED TYPE: ICD-10-CM

## 2018-06-08 PROCEDURE — 85025 COMPLETE CBC W/AUTO DIFF WBC: CPT

## 2018-06-08 PROCEDURE — 80053 COMPREHEN METABOLIC PANEL: CPT

## 2018-06-08 PROCEDURE — 82728 ASSAY OF FERRITIN: CPT

## 2018-06-08 PROCEDURE — 83540 ASSAY OF IRON: CPT

## 2018-06-08 PROCEDURE — 84443 ASSAY THYROID STIM HORMONE: CPT

## 2018-06-08 PROCEDURE — 84439 ASSAY OF FREE THYROXINE: CPT

## 2018-06-08 PROCEDURE — 83550 IRON BINDING TEST: CPT

## 2018-06-08 PROCEDURE — 36415 COLL VENOUS BLD VENIPUNCTURE: CPT

## 2018-06-08 PROCEDURE — 82306 VITAMIN D 25 HYDROXY: CPT

## 2018-06-08 PROCEDURE — 82607 VITAMIN B-12: CPT

## 2018-06-08 NOTE — TELEPHONE ENCOUNTER
Medical records release, signed by patient, successfully faxed to Dr. Betsy Hensley at [phone] requesting most recent progress note.

## 2018-06-20 ENCOUNTER — OFFICE VISIT (OUTPATIENT)
Dept: FAMILY MEDICINE CLINIC | Facility: CLINIC | Age: 63
End: 2018-06-20

## 2018-06-20 VITALS
HEIGHT: 62.6 IN | SYSTOLIC BLOOD PRESSURE: 130 MMHG | WEIGHT: 155 LBS | HEART RATE: 70 BPM | DIASTOLIC BLOOD PRESSURE: 76 MMHG | OXYGEN SATURATION: 97 % | BODY MASS INDEX: 27.81 KG/M2

## 2018-06-20 DIAGNOSIS — N95.1 HOT FLASHES, MENOPAUSAL: ICD-10-CM

## 2018-06-20 DIAGNOSIS — R53.82 CHRONIC FATIGUE: ICD-10-CM

## 2018-06-20 DIAGNOSIS — E78.00 HYPERCHOLESTEROLEMIA: ICD-10-CM

## 2018-06-20 DIAGNOSIS — Z76.89 ENCOUNTER FOR NEW MEDICATION PRESCRIPTION: ICD-10-CM

## 2018-06-20 DIAGNOSIS — G43.709 CHRONIC MIGRAINE WITHOUT AURA WITHOUT STATUS MIGRAINOSUS, NOT INTRACTABLE: Chronic | ICD-10-CM

## 2018-06-20 DIAGNOSIS — N95.1 HOT FLASHES, MENOPAUSAL: Primary | ICD-10-CM

## 2018-06-20 DIAGNOSIS — E89.0 POSTSURGICAL HYPOTHYROIDISM: ICD-10-CM

## 2018-06-20 PROCEDURE — 99214 OFFICE O/P EST MOD 30 MIN: CPT | Performed by: FAMILY MEDICINE

## 2018-06-20 RX ORDER — LEVOTHYROXINE SODIUM 88 MCG
88 TABLET ORAL
Qty: 90 TABLET | Refills: 3 | Status: SHIPPED | OUTPATIENT
Start: 2018-06-20 | End: 2019-06-16

## 2018-06-20 RX ORDER — ELETRIPTAN HYDROBROMIDE 20 MG/1
TABLET, FILM COATED ORAL
Qty: 10 TABLET | Refills: 2 | Status: SHIPPED | OUTPATIENT
Start: 2018-06-20 | End: 2019-06-03

## 2018-06-20 RX ORDER — VENLAFAXINE 37.5 MG/1
TABLET ORAL
Qty: 60 TABLET | Refills: 0 | Status: SHIPPED | OUTPATIENT
Start: 2018-06-20 | End: 2018-07-25

## 2018-06-20 RX ORDER — ELETRIPTAN HYDROBROMIDE 20 MG/1
20 TABLET, FILM COATED ORAL AS NEEDED
COMMUNITY
End: 2018-06-20

## 2018-06-20 RX ORDER — VENLAFAXINE 37.5 MG/1
TABLET ORAL
Qty: 174 TABLET | Refills: 0 | OUTPATIENT
Start: 2018-06-20

## 2018-06-20 NOTE — PROGRESS NOTES
Bob Banerjee is a 63year old female. HPI:     Hypothyroidism:  Stable. Patient states she has been on the same dose of levothyroxine for \"some time\".   Tolerating hormone replacement well, denies side effects such as chest pain, palpitations, Smokeless tobacco: Never Used                      Alcohol use:  No                  Family History   Problem Relation Age of Onset   • Heart Disease Father 78   • Asthma Father 78   • alzheimers [OTHER] Mother 80   • Arthritis Sister    • Thyroid disea MCV      81.0 - 100.0 fL 94.8     MCH      27.0 - 33.2 pg 29.3     MCHC      31.0 - 37.0 g/dL 30.9 (L)     RDW      11.5 - 16.0 % 13.3     RDW-SD      35.1 - 46.3 fL 46.5 (H)     Prelim Neutrophil Abs      1.30 - 6.70 x10 (3) uL 1.78     Neutrophils Abso without status migrainosus, not intractable  Postsurgical hypothyroidism  Hypercholesterolemia  Chronic fatigue  Encounter for new medication prescription    1. Hot flashes, menopausal  Trial of venlafaxine.   Follow-up in 3-4 weeks before running out of me Prescriptions Disp Refills    SYNTHROID 88 MCG Oral Tab 90 tablet 3      Sig: Take 1 tablet (88 mcg total) by mouth before breakfast.      Venlafaxine HCl 37.5 MG Oral Tab 60 tablet 0      Sig: Start 1 tab po q am for 3 days, then increase to twice a day.

## 2018-06-23 PROBLEM — N95.1 HOT FLASHES, MENOPAUSAL: Status: ACTIVE | Noted: 2018-06-23

## 2018-06-23 PROBLEM — R53.82 CHRONIC FATIGUE: Status: ACTIVE | Noted: 2018-06-23

## 2018-06-23 PROBLEM — R53.83 FATIGUE: Status: RESOLVED | Noted: 2018-06-06 | Resolved: 2018-06-23

## 2018-06-23 PROBLEM — E89.0 POSTSURGICAL HYPOTHYROIDISM: Status: ACTIVE | Noted: 2018-06-23

## 2018-06-29 ENCOUNTER — APPOINTMENT (OUTPATIENT)
Dept: LAB | Age: 63
End: 2018-06-29
Attending: FAMILY MEDICINE
Payer: COMMERCIAL

## 2018-06-29 DIAGNOSIS — E78.00 HYPERCHOLESTEROLEMIA: ICD-10-CM

## 2018-06-29 LAB
CHOLEST SMN-MCNC: 277 MG/DL (ref ?–200)
HDLC SERPL-MCNC: 94 MG/DL (ref 45–?)
HDLC SERPL: 2.95 {RATIO} (ref ?–4.44)
LDLC SERPL CALC-MCNC: 174 MG/DL (ref ?–130)
NONHDLC SERPL-MCNC: 183 MG/DL (ref ?–130)
TRIGL SERPL-MCNC: 45 MG/DL (ref ?–150)
VLDLC SERPL CALC-MCNC: 9 MG/DL (ref 5–40)

## 2018-06-29 PROCEDURE — 36415 COLL VENOUS BLD VENIPUNCTURE: CPT

## 2018-06-29 PROCEDURE — 80061 LIPID PANEL: CPT

## 2018-07-25 ENCOUNTER — OFFICE VISIT (OUTPATIENT)
Dept: FAMILY MEDICINE CLINIC | Facility: CLINIC | Age: 63
End: 2018-07-25
Payer: COMMERCIAL

## 2018-07-25 VITALS
BODY MASS INDEX: 27.49 KG/M2 | HEIGHT: 62.6 IN | DIASTOLIC BLOOD PRESSURE: 76 MMHG | RESPIRATION RATE: 16 BRPM | HEART RATE: 78 BPM | SYSTOLIC BLOOD PRESSURE: 120 MMHG | WEIGHT: 153.19 LBS

## 2018-07-25 DIAGNOSIS — F41.1 GAD (GENERALIZED ANXIETY DISORDER): ICD-10-CM

## 2018-07-25 DIAGNOSIS — F51.04 PSYCHOPHYSIOLOGICAL INSOMNIA: ICD-10-CM

## 2018-07-25 DIAGNOSIS — E78.00 HYPERCHOLESTEROLEMIA: Primary | ICD-10-CM

## 2018-07-25 DIAGNOSIS — R53.82 CHRONIC FATIGUE: ICD-10-CM

## 2018-07-25 DIAGNOSIS — N95.1 HOT FLASHES, MENOPAUSAL: ICD-10-CM

## 2018-07-25 PROCEDURE — 99214 OFFICE O/P EST MOD 30 MIN: CPT | Performed by: FAMILY MEDICINE

## 2018-07-25 RX ORDER — ACETAMINOPHEN 160 MG
2000 TABLET,DISINTEGRATING ORAL DAILY
COMMUNITY

## 2018-07-25 NOTE — PROGRESS NOTES
Ynes Chand is a 63year old female. HPI:     Hyperlipidemia:  Father had fatal MI at age 69yo thought to be related to his asthma. Fatigue:  Better since school is out, pt is a teacher's aid. Doing brisk walking.       Did not try the venla • alzheimers Kevyn Hanley Mother 80   • Arthritis Sister    • Thyroid disease Other      spouse   • High Blood Pressure Other      spouse     REVIEW OF SYSTEMS:   GENERAL HEALTH: overall feels \"okay\"  RESPIRATORY: Denies: TOMASZ  CARDIOVASCULAR: Denies CP/palp time.  Patient also declines counseling. 1. Hypercholesterolemia  LDL for the last 2 years 174-180. HDL above 60. Recommend [facility] to help risk stratify. Consider statin in the future pending calcium scoring.    - LIPID PANEL;  Future  - CT CALCIUM SCO

## 2018-07-27 NOTE — PATIENT INSTRUCTIONS
Understanding Generalized Anxiety Disorder (DIONNE)  Anxiety can fill you with worry and fear. Sometimes anxiety is healthy. It alerts you to a potential threat and gets you to respond and take action.  But for some people, anxiety gets so bad it causes prob that anxiety causes in your life. This helps you to be healthier and happier. Date Last Reviewed: 5/1/2017  © 0815-2387 [employer]. [address] [facility] [place], [address] [place] [address]. All rights reserved.  This information is not intended as a substi

## 2018-10-26 ENCOUNTER — APPOINTMENT (OUTPATIENT)
Dept: LAB | Age: 63
End: 2018-10-26
Attending: FAMILY MEDICINE
Payer: COMMERCIAL

## 2018-10-26 DIAGNOSIS — E78.00 HYPERCHOLESTEROLEMIA: ICD-10-CM

## 2018-10-26 PROCEDURE — 80061 LIPID PANEL: CPT

## 2018-10-26 PROCEDURE — 36415 COLL VENOUS BLD VENIPUNCTURE: CPT

## 2018-10-31 ENCOUNTER — TELEPHONE (OUTPATIENT)
Dept: FAMILY MEDICINE CLINIC | Facility: CLINIC | Age: 63
End: 2018-10-31

## 2018-10-31 NOTE — TELEPHONE ENCOUNTER
----- Message from Jeremy Ontiveros, DO sent at 10/30/2018  3:53 PM CDT -----  Please call patient: Please have patient make an appointment to see me for follow-up on very high cholesterol which is worsening. Okay to overbook patient.

## 2018-10-31 NOTE — TELEPHONE ENCOUNTER
A message was left on the patient's voicemail asking her to call back to schedule an appointment regarding her test results.

## 2018-11-16 ENCOUNTER — OFFICE VISIT (OUTPATIENT)
Dept: FAMILY MEDICINE CLINIC | Facility: CLINIC | Age: 63
End: 2018-11-16
Payer: COMMERCIAL

## 2018-11-16 VITALS
DIASTOLIC BLOOD PRESSURE: 80 MMHG | HEART RATE: 68 BPM | BODY MASS INDEX: 27 KG/M2 | SYSTOLIC BLOOD PRESSURE: 128 MMHG | WEIGHT: 152 LBS

## 2018-11-16 DIAGNOSIS — E78.00 HYPERCHOLESTEROLEMIA: Primary | ICD-10-CM

## 2018-11-16 DIAGNOSIS — J06.9 ACUTE UPPER RESPIRATORY INFECTION: ICD-10-CM

## 2018-11-16 DIAGNOSIS — Z76.89 ENCOUNTER FOR NEW MEDICATION PRESCRIPTION: ICD-10-CM

## 2018-11-16 PROCEDURE — 99214 OFFICE O/P EST MOD 30 MIN: CPT | Performed by: FAMILY MEDICINE

## 2018-11-16 RX ORDER — ROSUVASTATIN CALCIUM 5 MG/1
5 TABLET, COATED ORAL NIGHTLY
Qty: 30 TABLET | Refills: 0 | Status: SHIPPED | OUTPATIENT
Start: 2018-11-16 | End: 2019-05-06

## 2018-11-16 RX ORDER — ROSUVASTATIN CALCIUM 10 MG/1
10 TABLET, COATED ORAL NIGHTLY
Qty: 30 TABLET | Refills: 2 | Status: SHIPPED | OUTPATIENT
Start: 2018-12-14 | End: 2019-05-06

## 2018-11-16 NOTE — PROGRESS NOTES
Cherlynn Cheadle is a 63year old female. HPI:       Hyperlipidemia:  Worsening. LDL over 200. Per patient LDL has been insistently elevated for years. There is a family history of heart disease.   Patient has not been on medication for her hyperl removed   • H/O: hysterectomy    • Hot flashes, menopausal 6/23/2018   • Postsurgical hypothyroidism 6/23/2018   • Shingles    • Thyroid condition       Past Surgical History:   Procedure Laterality Date   • APPENDECTOMY  1960    [facility] [place]   • MICHAEL Radial pulses 2+ b/l.  no edema  GI: normal bowel sounds, NT/ND, no pulsations, no r/r/g, no masses, no HSM  EXTREMITIES: no cyanosis or clubbing  NEURO: Alert and Oriented x3, CN II-XII grossly intact, no focal weakness  PSYCH: affect normal, normal thoug consider prescribing antibiotic over the phone.      - Rosuvastatin Calcium 5 MG Oral Tab; Take 1 tablet (5 mg total) by mouth nightly. x4 weeks then start the 10mg tab  Dispense: 30 tablet;  Refill: 0          Orders Placed This Encounter      Lipid Panel

## 2018-11-18 NOTE — PATIENT INSTRUCTIONS
Lifestyle Changes to Control Cholesterol  You can control your cholesterol through diet, exercise, weight management, quitting smoking, stress management, and taking your medicines right. These things can also lower your risk for cardiovascular disease. bicycle or stationary bike  · Dancing  Managing your weight  If you are overweight or obese, your healthcare provider will work with you to help you lose weight and lower your BMI (body mass index).  Making diet changes and getting more physical activity ca your cholesterol numbers go down. Never stop taking your medicine unless your healthcare provider has told you it’s OK. · Ask your healthcare provider if you have any questions about your medicines.   High risk groups  Some people may need to take medicine professional medical care. Always follow your healthcare professional's instructions.

## 2019-04-24 ENCOUNTER — PATIENT OUTREACH (OUTPATIENT)
Dept: FAMILY MEDICINE CLINIC | Facility: CLINIC | Age: 64
End: 2019-04-24

## 2019-04-24 NOTE — PROGRESS NOTES
Spoke to patient and advised she is due for her annual wellness pt did not wish to schedule. Pt stated she would call back to schedule.

## 2019-05-06 ENCOUNTER — OFFICE VISIT (OUTPATIENT)
Dept: FAMILY MEDICINE CLINIC | Facility: CLINIC | Age: 64
End: 2019-05-06
Payer: COMMERCIAL

## 2019-05-06 VITALS
SYSTOLIC BLOOD PRESSURE: 132 MMHG | OXYGEN SATURATION: 99 % | TEMPERATURE: 98 F | RESPIRATION RATE: 16 BRPM | BODY MASS INDEX: 27.63 KG/M2 | HEART RATE: 63 BPM | DIASTOLIC BLOOD PRESSURE: 80 MMHG | HEIGHT: 62.5 IN | WEIGHT: 154 LBS

## 2019-05-06 DIAGNOSIS — E78.00 HIGH CHOLESTEROL: ICD-10-CM

## 2019-05-06 DIAGNOSIS — Z00.00 ROUTINE ADULT HEALTH MAINTENANCE: Primary | ICD-10-CM

## 2019-05-06 PROBLEM — G43.909 MIGRAINES: Status: ACTIVE | Noted: 2019-05-06

## 2019-05-06 PROBLEM — K21.9 ACID REFLUX: Status: ACTIVE | Noted: 2019-05-06

## 2019-05-06 PROCEDURE — 99213 OFFICE O/P EST LOW 20 MIN: CPT | Performed by: FAMILY MEDICINE

## 2019-05-06 PROCEDURE — 99396 PREV VISIT EST AGE 40-64: CPT | Performed by: FAMILY MEDICINE

## 2019-05-06 RX ORDER — CALCIUM CARBONATE 200(500)MG
1 TABLET,CHEWABLE ORAL AS NEEDED
COMMUNITY

## 2019-05-06 NOTE — PROGRESS NOTES
HPI:   Katlyn Barboza is a 66year old female who presents for a complete physical exam.    Patient has no complaints today. H/o breast ca, left, surgery, chemo, radiation. Dr. Cara Valderrama. Naldo. Dr. Ocasio Poster, gyne, ordered mammo.      [place] Mother 80   • Arthritis Sister    • Thyroid disease Other         spouse   • High Blood Pressure Other         spouse      Social History:   Social History    Tobacco Use      Smoking status: Never Smoker      Smokeless tobacco: Never Used    Alcohol use: cyanosis, or edema  NEURO: oriented times three, cranial nerves are grossly intact, no gross motor or sensory deficit.     ASSESSMENT AND PLAN:   Kerry Espinoza is a 66year old female who presents for a complete physical exam.    Pap and pelvic deferr can schedule it. Above age 48 or age 36 if family history of colon cancer, patient instructed to get colon cancer screening done which was discussed in detail. Pt educated on immunizations appropriate for age.      The patient verbalizes understandi

## 2019-05-09 ENCOUNTER — LAB ENCOUNTER (OUTPATIENT)
Dept: LAB | Age: 64
End: 2019-05-09
Attending: FAMILY MEDICINE
Payer: COMMERCIAL

## 2019-05-09 DIAGNOSIS — Z00.00 ROUTINE ADULT HEALTH MAINTENANCE: ICD-10-CM

## 2019-05-09 PROCEDURE — 85025 COMPLETE CBC W/AUTO DIFF WBC: CPT

## 2019-05-09 PROCEDURE — 80061 LIPID PANEL: CPT

## 2019-05-09 PROCEDURE — 36415 COLL VENOUS BLD VENIPUNCTURE: CPT

## 2019-05-09 PROCEDURE — 80053 COMPREHEN METABOLIC PANEL: CPT

## 2019-05-13 DIAGNOSIS — E78.00 ELEVATED CHOLESTEROL: Primary | ICD-10-CM

## 2019-06-03 DIAGNOSIS — G43.709 CHRONIC MIGRAINE WITHOUT AURA WITHOUT STATUS MIGRAINOSUS, NOT INTRACTABLE: Chronic | ICD-10-CM

## 2019-06-03 NOTE — TELEPHONE ENCOUNTER
Requesting ELETRIPTAN HYDROBROMIDE 20 MG   LOV: 5/6/19  RTC: 3 months  Last Relevant Labs: 5/9/19  Filled: 6/20/18 # 10 with 2 refills    Future Appointments   Date Time Provider Howard Saez   8/12/2019  9:20 AM Uday Marroquin MD [facility] [address]

## 2019-06-05 RX ORDER — ELETRIPTAN HYDROBROMIDE 20 MG/1
TABLET, FILM COATED ORAL
Qty: 10 TABLET | Refills: 2 | Status: SHIPPED | OUTPATIENT
Start: 2019-06-05 | End: 2020-07-22

## 2019-06-16 DIAGNOSIS — E89.0 POSTSURGICAL HYPOTHYROIDISM: ICD-10-CM

## 2019-06-18 DIAGNOSIS — Z76.0 MEDICATION REFILL: ICD-10-CM

## 2019-06-18 DIAGNOSIS — E03.9 ACQUIRED HYPOTHYROIDISM: Chronic | ICD-10-CM

## 2019-06-18 DIAGNOSIS — E89.0 HX OF THYROIDECTOMY: ICD-10-CM

## 2019-06-18 RX ORDER — LEVOTHYROXINE SODIUM 88 MCG
TABLET ORAL
Qty: 30 TABLET | Refills: 1 | Status: SHIPPED | OUTPATIENT
Start: 2019-06-18 | End: 2019-08-27

## 2019-06-18 NOTE — TELEPHONE ENCOUNTER
Requested Medications      Name from pharmacy: SYNTHROID 0.088MG (88MCG)TABLETS         Will file in chart as: SYNTHROID 88 MCG Oral Tab    The source prescription was reordered on 6/20/2018 by Davide Bolden DO.    Sig: TAKE 1 TABLET BY MOUTH EVERY DAY

## 2019-07-01 RX ORDER — LEVOTHYROXINE SODIUM 88 MCG
TABLET ORAL
Qty: 30 TABLET | Refills: 0 | Status: SHIPPED | OUTPATIENT
Start: 2019-07-01 | End: 2019-10-22

## 2019-08-10 ENCOUNTER — APPOINTMENT (OUTPATIENT)
Dept: LAB | Age: 64
End: 2019-08-10
Attending: FAMILY MEDICINE
Payer: COMMERCIAL

## 2019-08-10 DIAGNOSIS — E03.9 ACQUIRED HYPOTHYROIDISM: Chronic | ICD-10-CM

## 2019-08-10 DIAGNOSIS — E78.00 ELEVATED CHOLESTEROL: ICD-10-CM

## 2019-08-10 LAB
CHOLEST SMN-MCNC: 295 MG/DL (ref ?–200)
HDLC SERPL-MCNC: 81 MG/DL (ref 40–59)
LDLC SERPL CALC-MCNC: 203 MG/DL (ref ?–100)
NONHDLC SERPL-MCNC: 214 MG/DL (ref ?–130)
TRIGL SERPL-MCNC: 53 MG/DL (ref 30–149)
TSI SER-ACNC: 2.42 MIU/ML (ref 0.36–3.74)
VLDLC SERPL CALC-MCNC: 11 MG/DL (ref 0–30)

## 2019-08-10 PROCEDURE — 80061 LIPID PANEL: CPT

## 2019-08-10 PROCEDURE — 84443 ASSAY THYROID STIM HORMONE: CPT

## 2019-08-10 PROCEDURE — 36415 COLL VENOUS BLD VENIPUNCTURE: CPT

## 2019-08-14 ENCOUNTER — OFFICE VISIT (OUTPATIENT)
Dept: FAMILY MEDICINE CLINIC | Facility: CLINIC | Age: 64
End: 2019-08-14
Payer: COMMERCIAL

## 2019-08-14 VITALS
TEMPERATURE: 99 F | BODY MASS INDEX: 27.52 KG/M2 | SYSTOLIC BLOOD PRESSURE: 122 MMHG | DIASTOLIC BLOOD PRESSURE: 78 MMHG | WEIGHT: 153.38 LBS | HEART RATE: 54 BPM | HEIGHT: 62.5 IN

## 2019-08-14 DIAGNOSIS — E78.00 HIGH CHOLESTEROL: Primary | ICD-10-CM

## 2019-08-14 DIAGNOSIS — Z13.6 ISCHEMIC HEART DISEASE SCREEN: ICD-10-CM

## 2019-08-14 PROCEDURE — 99213 OFFICE O/P EST LOW 20 MIN: CPT | Performed by: FAMILY MEDICINE

## 2019-08-14 RX ORDER — ATORVASTATIN CALCIUM 10 MG/1
10 TABLET, FILM COATED ORAL NIGHTLY
Qty: 90 TABLET | Refills: 0 | Status: SHIPPED | OUTPATIENT
Start: 2019-08-14 | End: 2019-10-22

## 2019-08-14 NOTE — PROGRESS NOTES
HPI:   Ofe Hou is a 66year old female that presents for Patient presents with: Follow - Up: 3 month f/u.  Go over lab test results     Mammogram done 1 mth ago, dequan Mcnair,     Past medical, surgical, family and social history rev the following:    Height as of this encounter: 62.5\". Weight as of this encounter: 153 lb 6.4 oz. Vital signs reviewed. Appears stated age, well groomed.   Physical Exam:  GEN:  Patient is alert, awake and oriented, well developed, well nourished, no a concerns addressed. Red flags to RTC or ED reviewed. Patient (or parent) agrees to plan. No follow-ups on file. Shea Roberts M.D.   Family Medicine   8/14/2019  10:26 AM

## 2019-08-15 ENCOUNTER — TELEPHONE (OUTPATIENT)
Dept: FAMILY MEDICINE CLINIC | Facility: CLINIC | Age: 64
End: 2019-08-15

## 2019-08-15 NOTE — TELEPHONE ENCOUNTER
Received mammogram results from ob/gyn office/ She had mammogram on 7/17/19 and was normal. Flow sheet updated and results sent to 's in basket for review and signature.

## 2019-08-21 ENCOUNTER — HOSPITAL ENCOUNTER (OUTPATIENT)
Dept: CT IMAGING | Age: 64
Discharge: HOME | End: 2019-08-21
Attending: FAMILY MEDICINE

## 2019-08-21 DIAGNOSIS — Z13.9 SPECIAL SCREENING: ICD-10-CM

## 2019-08-21 NOTE — PROGRESS NOTES
Pt seen at [facility] for CTHS:    PRELIMINARY SCORE=0 ( zero)    FW=948/76    Cholestec labs as follows: declined cholestec labs- patient had full lipid panel 8/10 results below    ES=684    HDL=81    AIY=008    TG=53    GLUCOSE=    All re

## 2019-08-27 DIAGNOSIS — Z76.0 MEDICATION REFILL: ICD-10-CM

## 2019-08-27 DIAGNOSIS — E89.0 HX OF THYROIDECTOMY: ICD-10-CM

## 2019-08-27 DIAGNOSIS — E03.9 ACQUIRED HYPOTHYROIDISM: Chronic | ICD-10-CM

## 2019-08-27 RX ORDER — LEVOTHYROXINE SODIUM 88 MCG
TABLET ORAL
Qty: 30 TABLET | Refills: 0 | Status: SHIPPED | OUTPATIENT
Start: 2019-08-27 | End: 2020-10-20

## 2019-08-27 NOTE — TELEPHONE ENCOUNTER
Requested Prescriptions     Pending Prescriptions Disp Refills   • SYNTHROID 88 MCG Oral Tab [Pharmacy Med Name: SYNTHROID 0.088MG (88MCG)TABLETS] 30 tablet 0     Sig: TAKE 1 TABLET BY MOUTH EVERY DAY BEFORE BREAKFAST       LOV: 8/14/2019  RTC: 1 month  La

## 2019-09-19 LAB
ALBUMIN/GLOBULIN RATIO: 1.8 (CALC) (ref 1–2.5)
ALBUMIN: 4.4 G/DL (ref 3.6–5.1)
ALKALINE PHOSPHATASE: 62 U/L (ref 33–130)
ALT: 13 U/L (ref 6–29)
AST: 19 U/L (ref 10–35)
BILIRUBIN, TOTAL: 0.6 MG/DL (ref 0.2–1.2)
BUN: 14 MG/DL (ref 7–25)
CALCIUM: 9.7 MG/DL (ref 8.6–10.4)
CARBON DIOXIDE: 29 MMOL/L (ref 20–32)
CHLORIDE: 100 MMOL/L (ref 98–110)
CHOL/HDLC RATIO: 2.5 (CALC)
CHOLESTEROL, TOTAL: 195 MG/DL
CREATININE: 0.86 MG/DL (ref 0.5–0.99)
EGFR IF AFRICN AM: 85 ML/MIN/1.73M2
EGFR IF NONAFRICN AM: 73 ML/MIN/1.73M2
GLOBULIN: 2.5 G/DL (CALC) (ref 1.9–3.7)
GLUCOSE: 78 MG/DL (ref 65–99)
HDL CHOLESTEROL: 77 MG/DL
LDL-CHOLESTEROL: 105 MG/DL (CALC)
NON-HDL CHOLESTEROL: 118 MG/DL (CALC)
POTASSIUM: 4.1 MMOL/L (ref 3.5–5.3)
PROTEIN, TOTAL: 6.9 G/DL (ref 6.1–8.1)
SODIUM: 136 MMOL/L (ref 135–146)
TRIGLYCERIDES: 51 MG/DL

## 2019-09-25 ENCOUNTER — OFFICE VISIT (OUTPATIENT)
Dept: FAMILY MEDICINE CLINIC | Facility: CLINIC | Age: 64
End: 2019-09-25
Payer: COMMERCIAL

## 2019-09-25 VITALS
RESPIRATION RATE: 16 BRPM | HEART RATE: 54 BPM | TEMPERATURE: 100 F | SYSTOLIC BLOOD PRESSURE: 120 MMHG | DIASTOLIC BLOOD PRESSURE: 80 MMHG | BODY MASS INDEX: 27.52 KG/M2 | WEIGHT: 153.38 LBS | HEIGHT: 62.5 IN

## 2019-09-25 DIAGNOSIS — N95.1 HOT FLASHES, MENOPAUSAL: Primary | ICD-10-CM

## 2019-09-25 DIAGNOSIS — E78.00 HYPERCHOLESTEROLEMIA: ICD-10-CM

## 2019-09-25 PROCEDURE — 99214 OFFICE O/P EST MOD 30 MIN: CPT | Performed by: FAMILY MEDICINE

## 2019-09-25 PROCEDURE — 90471 IMMUNIZATION ADMIN: CPT | Performed by: FAMILY MEDICINE

## 2019-09-25 PROCEDURE — 90686 IIV4 VACC NO PRSV 0.5 ML IM: CPT | Performed by: FAMILY MEDICINE

## 2019-09-25 RX ORDER — CLONIDINE HYDROCHLORIDE 0.1 MG/1
0.1 TABLET ORAL EVERY MORNING
Qty: 30 TABLET | Refills: 0 | Status: SHIPPED | OUTPATIENT
Start: 2019-09-25 | End: 2020-07-22

## 2019-09-25 NOTE — PROGRESS NOTES
HPI:   Dilip Negron is a 66year old female that presents for Patient presents with:  Imm/Inj: Patient is requestinbg flu vaccine. Lab Results  Other: Patient had a colonoscopy last year at [facility] and pap smear last month at [address] (MULTIVITAMIN ADULT OR), Take 1 tablet by mouth daily. , Disp: , Rfl:     REVIEW OF SYSTEMS:     Comprehensive ROS negative unless noted in HPI    PHYSICAL EXAM:   /80 (BP Location: Left arm, Patient Position: Sitting)   Pulse 54   Temp 99.7 °F (37.6 benefits, and alternatives of current treatment plan discussed in detail. Questions and concerns addressed. Red flags to RTC or ED reviewed. Patient (or parent) agrees to plan. No follow-ups on file. Alexandria Rico M.D.   Family Medicine   9/25/20

## 2019-10-22 DIAGNOSIS — E78.00 HIGH CHOLESTEROL: ICD-10-CM

## 2019-10-22 DIAGNOSIS — E89.0 POSTSURGICAL HYPOTHYROIDISM: ICD-10-CM

## 2019-10-22 RX ORDER — ATORVASTATIN CALCIUM 10 MG/1
TABLET, FILM COATED ORAL
Qty: 90 TABLET | Refills: 0 | Status: SHIPPED | OUTPATIENT
Start: 2019-10-22 | End: 2020-02-24

## 2019-10-22 RX ORDER — LEVOTHYROXINE SODIUM 88 MCG
TABLET ORAL
Qty: 90 TABLET | Refills: 0 | Status: SHIPPED | OUTPATIENT
Start: 2019-10-22 | End: 2020-01-20

## 2019-10-23 DIAGNOSIS — E89.0 HX OF THYROIDECTOMY: ICD-10-CM

## 2019-10-23 DIAGNOSIS — E03.9 ACQUIRED HYPOTHYROIDISM: Chronic | ICD-10-CM

## 2019-10-23 DIAGNOSIS — Z76.0 MEDICATION REFILL: ICD-10-CM

## 2019-10-23 RX ORDER — LEVOTHYROXINE SODIUM 88 MCG
TABLET ORAL
Qty: 30 TABLET | Refills: 0 | OUTPATIENT
Start: 2019-10-23

## 2019-10-23 NOTE — TELEPHONE ENCOUNTER
Requested Prescriptions     Pending Prescriptions Disp Refills   • SYNTHROID 88 MCG Oral Tab [Pharmacy Med Name: SYNTHROID 0.088MG (88MCG)TABLETS] 30 tablet 0     Sig: TAKE 1 TABLET BY MOUTH EVERY DAY BEFORE BREAKFAST       LOV: 9/25/2019  RTC: 2 weeks  La

## 2019-10-25 ENCOUNTER — OFFICE VISIT (OUTPATIENT)
Dept: FAMILY MEDICINE CLINIC | Facility: CLINIC | Age: 64
End: 2019-10-25
Payer: COMMERCIAL

## 2019-10-25 VITALS
HEIGHT: 62.5 IN | BODY MASS INDEX: 27.6 KG/M2 | WEIGHT: 153.81 LBS | DIASTOLIC BLOOD PRESSURE: 80 MMHG | HEART RATE: 75 BPM | TEMPERATURE: 98 F | SYSTOLIC BLOOD PRESSURE: 122 MMHG

## 2019-10-25 DIAGNOSIS — M54.6 RIGHT-SIDED THORACIC BACK PAIN, UNSPECIFIED CHRONICITY: Primary | ICD-10-CM

## 2019-10-25 PROCEDURE — 99213 OFFICE O/P EST LOW 20 MIN: CPT | Performed by: FAMILY MEDICINE

## 2019-10-25 NOTE — PROGRESS NOTES
HPI:   Saqib Hurt is a 66year old female that presents for Patient presents with:  Back Pain: Upper thoracic back pain. Has been going on for a while. Patient has pain in the right thoracic region near the shoulder blade for the past 1 week. (Oral)   Ht 62.5\"   Wt 153 lb 12.8 oz (69.8 kg)   BMI 27.68 kg/m²  Estimated body mass index is 27.68 kg/m² as calculated from the following:    Height as of this encounter: 62.5\". Weight as of this encounter: 153 lb 12.8 oz (69.8 kg).    Vital signs r

## 2020-01-20 DIAGNOSIS — E89.0 POSTSURGICAL HYPOTHYROIDISM: ICD-10-CM

## 2020-01-20 RX ORDER — LEVOTHYROXINE SODIUM 88 MCG
TABLET ORAL
Qty: 90 TABLET | Refills: 0 | Status: SHIPPED | OUTPATIENT
Start: 2020-01-20 | End: 2020-04-20

## 2020-01-20 NOTE — TELEPHONE ENCOUNTER
Requested Prescriptions     Pending Prescriptions Disp Refills   • SYNTHROID 88 MCG Oral Tab [Pharmacy Med Name: SYNTHROID 0.088MG (88MCG)TABLETS] 90 tablet 0     Sig: TAKE 1 TABLET BY MOUTH EVERY DAY BEFORE BREAKFAST.        LOV: 10/25/2019  RTC: not speci

## 2020-02-23 DIAGNOSIS — E78.00 HIGH CHOLESTEROL: ICD-10-CM

## 2020-02-24 RX ORDER — ATORVASTATIN CALCIUM 10 MG/1
TABLET, FILM COATED ORAL
Qty: 90 TABLET | Refills: 0 | Status: SHIPPED | OUTPATIENT
Start: 2020-02-24 | End: 2020-05-19

## 2020-02-24 NOTE — TELEPHONE ENCOUNTER
Name from pharmacy: ATORVASTATIN 10MG TABLETS          Will file in chart as: ATORVASTATIN 10 MG Oral Tab         Sig: TAKE 1 TABLET BY MOUTH NIGHTLY    Disp:  90 tablet    Refills:  0 (Pharmacy requested: Not specified)    Start: 2/23/2020    Class: Mikhail Austin

## 2020-04-19 DIAGNOSIS — E89.0 POSTSURGICAL HYPOTHYROIDISM: ICD-10-CM

## 2020-04-20 DIAGNOSIS — G43.709 CHRONIC MIGRAINE WITHOUT AURA WITHOUT STATUS MIGRAINOSUS, NOT INTRACTABLE: Chronic | ICD-10-CM

## 2020-04-20 RX ORDER — LEVOTHYROXINE SODIUM 88 MCG
TABLET ORAL
Qty: 90 TABLET | Refills: 0 | Status: SHIPPED | OUTPATIENT
Start: 2020-04-20 | End: 2020-07-20

## 2020-04-20 RX ORDER — ELETRIPTAN HYDROBROMIDE 20 MG/1
TABLET, FILM COATED ORAL
Qty: 10 TABLET | Refills: 2 | OUTPATIENT
Start: 2020-04-20

## 2020-04-20 NOTE — TELEPHONE ENCOUNTER
Requested Prescriptions     Pending Prescriptions Disp Refills   • SYNTHROID 88 MCG Oral Tab [Pharmacy Med Name: SYNTHROID 0.088MG (88MCG)TABLETS] 90 tablet 0     Sig: TAKE 1 TABLET BY MOUTH EVERY DAY BEFORE BREAKFAST       LOV: 10/25/19 acute visit   RTC:

## 2020-05-03 DIAGNOSIS — G43.709 CHRONIC MIGRAINE WITHOUT AURA WITHOUT STATUS MIGRAINOSUS, NOT INTRACTABLE: Chronic | ICD-10-CM

## 2020-05-04 RX ORDER — ELETRIPTAN HYDROBROMIDE 20 MG/1
TABLET, FILM COATED ORAL
Qty: 10 TABLET | Refills: 2 | OUTPATIENT
Start: 2020-05-04

## 2020-05-19 DIAGNOSIS — E78.00 HIGH CHOLESTEROL: ICD-10-CM

## 2020-05-19 RX ORDER — ATORVASTATIN CALCIUM 10 MG/1
TABLET, FILM COATED ORAL
Qty: 90 TABLET | Refills: 1 | Status: SHIPPED | OUTPATIENT
Start: 2020-05-19 | End: 2020-07-22

## 2020-05-19 NOTE — TELEPHONE ENCOUNTER
Cholesterol Medication Protocol Passed5/19 10:57 AM   ALT < 80    ALT resulted within past year    Lipid panel within past 12 months    Appointment within past 12 or next 3 months     Requesting ATORVASTATIN 10 MG   LOV: 10/25/19  RTC: No follow-ups on venkatesh

## 2020-07-18 DIAGNOSIS — E89.0 POSTSURGICAL HYPOTHYROIDISM: ICD-10-CM

## 2020-07-20 RX ORDER — LEVOTHYROXINE SODIUM 88 MCG
TABLET ORAL
Qty: 90 TABLET | Refills: 0 | Status: SHIPPED | OUTPATIENT
Start: 2020-07-20 | End: 2020-07-22

## 2020-07-20 NOTE — TELEPHONE ENCOUNTER
Name from pharmacy: SYNTHROID 0.088MG (88MCG)TABLETS          Will file in chart as: SYNTHROID 88 MCG Oral Tab         Sig: TAKE 1 TABLET BY MOUTH EVERY MORNING BEFORE BREAKFAST    Disp:  90 tablet    Refills:  0 (Pharmacy requested: Not specified)    CHRISTIANO

## 2020-07-22 ENCOUNTER — OFFICE VISIT (OUTPATIENT)
Dept: FAMILY MEDICINE CLINIC | Facility: CLINIC | Age: 65
End: 2020-07-22
Payer: COMMERCIAL

## 2020-07-22 VITALS
SYSTOLIC BLOOD PRESSURE: 124 MMHG | HEART RATE: 64 BPM | WEIGHT: 155.19 LBS | DIASTOLIC BLOOD PRESSURE: 79 MMHG | RESPIRATION RATE: 16 BRPM | OXYGEN SATURATION: 98 % | HEIGHT: 62.5 IN | BODY MASS INDEX: 27.84 KG/M2 | TEMPERATURE: 99 F

## 2020-07-22 DIAGNOSIS — Z00.00 ROUTINE ADULT HEALTH MAINTENANCE: Primary | ICD-10-CM

## 2020-07-22 DIAGNOSIS — G43.709 CHRONIC MIGRAINE WITHOUT AURA WITHOUT STATUS MIGRAINOSUS, NOT INTRACTABLE: Chronic | ICD-10-CM

## 2020-07-22 DIAGNOSIS — E78.00 HIGH CHOLESTEROL: ICD-10-CM

## 2020-07-22 PROCEDURE — 3078F DIAST BP <80 MM HG: CPT | Performed by: FAMILY MEDICINE

## 2020-07-22 PROCEDURE — 99396 PREV VISIT EST AGE 40-64: CPT | Performed by: FAMILY MEDICINE

## 2020-07-22 PROCEDURE — 3074F SYST BP LT 130 MM HG: CPT | Performed by: FAMILY MEDICINE

## 2020-07-22 PROCEDURE — 3008F BODY MASS INDEX DOCD: CPT | Performed by: FAMILY MEDICINE

## 2020-07-22 RX ORDER — ELETRIPTAN HYDROBROMIDE 20 MG/1
TABLET, FILM COATED ORAL
Qty: 10 TABLET | Refills: 2 | Status: SHIPPED | OUTPATIENT
Start: 2020-07-22 | End: 2021-02-22

## 2020-07-22 RX ORDER — ATORVASTATIN CALCIUM 10 MG/1
10 TABLET, FILM COATED ORAL DAILY
Qty: 90 TABLET | Refills: 1 | Status: SHIPPED | OUTPATIENT
Start: 2020-07-22 | End: 2022-01-24

## 2020-07-22 NOTE — PROGRESS NOTES
HPI:   Eduardo Birch is a 60year old female who presents for a complete physical exam.    Patient has no complaints today. 28 yrs on brand synthroid.    HPI:   Eduardo Birch is a 60year old female who presents for a complete physical exam. AFTER 2 HOURS. MAX 40 MG IN 24 HOURS 10 tablet 2   • SYNTHROID 88 MCG Oral Tab TAKE 1 TABLET BY MOUTH EVERY DAY BEFORE BREAKFAST 30 tablet 0   • Calcium Carbonate Antacid 500 MG Oral Chew Tab Chew 1 tablet by mouth as needed for Heartburn.      • Vitamin D3 bleeding abnormalities  ENDOCRINE: denies temperature intolerance, polyuria, or excessive sweating.   LYMPHATICS: denies swollen glands      EXAM:   /79 (BP Location: Right arm, Patient Position: Sitting)   Pulse 64   Temp 98.6 °F (37 °C) (Temporal) Ghee.    Patient instructed on self breast exam monthly. Patient instructed on getting yearly Pap smear and mammogram.     Patient educated on taking calcium and Vit D supplementation and on osteoporosis and bone density testing.      Aerobic exercise 3 CARBON DIOXIDE 29 20 - 32 mmol/L    CALCIUM 9.7 8.6 - 10.4 mg/dL    PROTEIN, TOTAL 6.9 6.1 - 8.1 g/dL    ALBUMIN 4.4 3.6 - 5.1 g/dL    GLOBULIN 2.5 1.9 - 3.7 g/dL (calc)    ALBUMIN/GLOBULIN RATIO 1.8 1.0 - 2.5 (calc)    BILIRUBIN, TOTAL 0.6 0.2 - 1.2 mg changes  SKIN: denies any unusual skin lesions or rashes  EYES:denies vision changes  HEENT: denies upper respiratory symptoms  LUNGS: denies cough or shortness of breath with exertion  CHEST:  denies breast changes or pain  CARDIOVASCULAR: denies chest pa monthly. Patient instructed on getting yearly Pap smear and mammogram.     Patient educated on taking calcium and Vit D supplementation and on osteoporosis and bone density testing.      Aerobic exercise 30 minutes five days a week for cardiovascular fi

## 2020-07-31 DIAGNOSIS — D72.819 LEUKOPENIA, UNSPECIFIED TYPE: Primary | ICD-10-CM

## 2020-07-31 LAB
ABSOLUTE BASOPHILS: 10 CELLS/UL (ref 0–200)
ABSOLUTE EOSINOPHILS: 92 CELLS/UL (ref 15–500)
ABSOLUTE LYMPHOCYTES: 1513 CELLS/UL (ref 850–3900)
ABSOLUTE MONOCYTES: 374 CELLS/UL (ref 200–950)
ABSOLUTE NEUTROPHILS: 1411 CELLS/UL (ref 1500–7800)
ALBUMIN/GLOBULIN RATIO: 1.7 (CALC) (ref 1–2.5)
ALBUMIN: 4.3 G/DL (ref 3.6–5.1)
ALKALINE PHOSPHATASE: 60 U/L (ref 37–153)
ALT: 12 U/L (ref 6–29)
AST: 18 U/L (ref 10–35)
BASOPHILS: 0.3 %
BILIRUBIN, TOTAL: 0.6 MG/DL (ref 0.2–1.2)
BUN: 14 MG/DL (ref 7–25)
CALCIUM: 9.6 MG/DL (ref 8.6–10.4)
CARBON DIOXIDE: 28 MMOL/L (ref 20–32)
CHLORIDE: 102 MMOL/L (ref 98–110)
CHOL/HDLC RATIO: 2.7 (CALC)
CHOLESTEROL, TOTAL: 219 MG/DL
CREATININE: 0.94 MG/DL (ref 0.5–0.99)
EGFR IF AFRICN AM: 75 ML/MIN/1.73M2
EGFR IF NONAFRICN AM: 65 ML/MIN/1.73M2
EOSINOPHILS: 2.7 %
GLOBULIN: 2.5 G/DL (CALC) (ref 1.9–3.7)
GLUCOSE: 83 MG/DL (ref 65–99)
HDL CHOLESTEROL: 81 MG/DL
HEMATOCRIT: 38.1 % (ref 35–45)
HEMOGLOBIN: 12.7 G/DL (ref 11.7–15.5)
LDL-CHOLESTEROL: 124 MG/DL (CALC)
LYMPHOCYTES: 44.5 %
MCH: 30 PG (ref 27–33)
MCHC: 33.3 G/DL (ref 32–36)
MCV: 90.1 FL (ref 80–100)
MONOCYTES: 11 %
MPV: 10.4 FL (ref 7.5–12.5)
NEUTROPHILS: 41.5 %
NON-HDL CHOLESTEROL: 138 MG/DL (CALC)
PLATELET COUNT: 207 THOUSAND/UL (ref 140–400)
POTASSIUM: 4.2 MMOL/L (ref 3.5–5.3)
PROTEIN, TOTAL: 6.8 G/DL (ref 6.1–8.1)
RDW: 12.9 % (ref 11–15)
RED BLOOD CELL COUNT: 4.23 MILLION/UL (ref 3.8–5.1)
SODIUM: 138 MMOL/L (ref 135–146)
T4, FREE: 1.4 NG/DL (ref 0.8–1.8)
TRIGLYCERIDES: 47 MG/DL
TSH: 2.43 MIU/L (ref 0.4–4.5)
WHITE BLOOD CELL COUNT: 3.4 THOUSAND/UL (ref 3.8–10.8)

## 2020-10-19 ENCOUNTER — OFFICE VISIT (OUTPATIENT)
Dept: FAMILY MEDICINE CLINIC | Facility: CLINIC | Age: 65
End: 2020-10-19
Payer: COMMERCIAL

## 2020-10-19 VITALS
RESPIRATION RATE: 16 BRPM | BODY MASS INDEX: 28.37 KG/M2 | DIASTOLIC BLOOD PRESSURE: 72 MMHG | HEART RATE: 70 BPM | SYSTOLIC BLOOD PRESSURE: 112 MMHG | HEIGHT: 62.5 IN | TEMPERATURE: 99 F | WEIGHT: 158.13 LBS

## 2020-10-19 DIAGNOSIS — Z12.11 SCREENING FOR COLON CANCER: ICD-10-CM

## 2020-10-19 DIAGNOSIS — E89.0 HX OF THYROIDECTOMY: Primary | ICD-10-CM

## 2020-10-19 DIAGNOSIS — E89.0 POSTSURGICAL HYPOTHYROIDISM: ICD-10-CM

## 2020-10-19 PROCEDURE — 3008F BODY MASS INDEX DOCD: CPT | Performed by: FAMILY MEDICINE

## 2020-10-19 PROCEDURE — 3074F SYST BP LT 130 MM HG: CPT | Performed by: FAMILY MEDICINE

## 2020-10-19 PROCEDURE — 3078F DIAST BP <80 MM HG: CPT | Performed by: FAMILY MEDICINE

## 2020-10-19 PROCEDURE — 99213 OFFICE O/P EST LOW 20 MIN: CPT | Performed by: FAMILY MEDICINE

## 2020-10-19 RX ORDER — LEVOTHYROXINE SODIUM 88 UG/1
88 TABLET ORAL
Qty: 90 TABLET | Refills: 0 | Status: SHIPPED | OUTPATIENT
Start: 2020-10-19 | End: 2021-02-22

## 2020-10-19 NOTE — PROGRESS NOTES
Patient presents with:  Medication Follow-Up: would like to discuss generic Levothyroxine per her insurance      HPI:    Hx of thyroid nodule s/p thyroidectomy. Has been on synthroid for 28 years and insurance wants her to be on generic per insurance.    Hx hypothyroidism     Hot flashes, menopausal     Chronic fatigue     Hypercholesterolemia     Migraines     Malignant neoplasm of breast (HCC)     Acid reflux      Past Medical History:   Diagnosis Date   • Breast cancer ([facility] [address]     thyroid removed   • H/O: hy months and older PFS 0.5 ml (75650)                          11/08/2016  10/14/2020      Fluarix 6 Months And Older 0.5 ml prefilled syringe (90916)                          11/09/2018      Influenza             11/05/2018      TDAP                  11/14/

## 2020-12-07 ENCOUNTER — TELEPHONE (OUTPATIENT)
Dept: FAMILY MEDICINE CLINIC | Facility: CLINIC | Age: 65
End: 2020-12-07

## 2020-12-07 DIAGNOSIS — Z23 NEED FOR SHINGLES VACCINE: Primary | ICD-10-CM

## 2020-12-07 NOTE — TELEPHONE ENCOUNTER
Pt would like to schedule to get her shingles shot. Please call pt when able to schedule at [phone].

## 2020-12-07 NOTE — TELEPHONE ENCOUNTER
Pt requesting shingles vaccine. Order pended, please route back to me so we can get her scheduled. Thanks!

## 2020-12-08 ENCOUNTER — TELEPHONE (OUTPATIENT)
Dept: FAMILY MEDICINE CLINIC | Facility: CLINIC | Age: 65
End: 2020-12-08

## 2020-12-08 DIAGNOSIS — E78.00 HIGH CHOLESTEROL: ICD-10-CM

## 2020-12-08 DIAGNOSIS — D72.819 LEUKOPENIA, UNSPECIFIED TYPE: ICD-10-CM

## 2020-12-08 DIAGNOSIS — E89.0 HX OF THYROIDECTOMY: Primary | ICD-10-CM

## 2020-12-08 NOTE — TELEPHONE ENCOUNTER
Pt will need thyroid lab order sent to [facility]. She would also like to know if she can have a lipid test. Please call patient once orders is placed.

## 2020-12-08 NOTE — TELEPHONE ENCOUNTER
Future Appointments   Date Time Provider Howard Saez   12/16/2020 10:00 AM [facility] 20 NURSE [facility] [address] [facility] [address]

## 2020-12-16 ENCOUNTER — TELEPHONE (OUTPATIENT)
Dept: FAMILY MEDICINE CLINIC | Facility: CLINIC | Age: 65
End: 2020-12-16

## 2020-12-16 ENCOUNTER — NURSE ONLY (OUTPATIENT)
Dept: FAMILY MEDICINE CLINIC | Facility: CLINIC | Age: 65
End: 2020-12-16
Payer: COMMERCIAL

## 2020-12-16 PROCEDURE — 90471 IMMUNIZATION ADMIN: CPT | Performed by: FAMILY MEDICINE

## 2020-12-16 PROCEDURE — 90750 HZV VACC RECOMBINANT IM: CPT | Performed by: FAMILY MEDICINE

## 2020-12-16 NOTE — TELEPHONE ENCOUNTER
Shingrix dose #2 pended for future appointment.    Future Appointments   Date Time Provider Howard Saez   3/17/2021  9:30 AM EM[facility] 20 NURSE EM[facility] 20 [facility] [address]

## 2020-12-16 NOTE — TELEPHONE ENCOUNTER
Pt here for 1st Shingrix vaccine, pt scheduled appointment for 2nd injection.    Future Appointments   Date Time Provider Howard Saez   3/17/2021  9:30 AM EMG 20 NURSE EMG 20 [facility] [address]

## 2021-01-06 ENCOUNTER — OFFICE VISIT (OUTPATIENT)
Dept: FAMILY MEDICINE CLINIC | Facility: CLINIC | Age: 66
End: 2021-01-06
Payer: COMMERCIAL

## 2021-01-06 VITALS
TEMPERATURE: 98 F | RESPIRATION RATE: 16 BRPM | HEIGHT: 62.5 IN | WEIGHT: 158.38 LBS | DIASTOLIC BLOOD PRESSURE: 80 MMHG | SYSTOLIC BLOOD PRESSURE: 130 MMHG | BODY MASS INDEX: 28.42 KG/M2 | HEART RATE: 68 BPM

## 2021-01-06 DIAGNOSIS — E03.9 HYPOTHYROIDISM, UNSPECIFIED TYPE: Primary | ICD-10-CM

## 2021-01-06 DIAGNOSIS — L29.9 PRURITUS: ICD-10-CM

## 2021-01-06 DIAGNOSIS — L30.9 DERMATITIS: ICD-10-CM

## 2021-01-06 PROCEDURE — 99214 OFFICE O/P EST MOD 30 MIN: CPT | Performed by: FAMILY MEDICINE

## 2021-01-06 PROCEDURE — 3075F SYST BP GE 130 - 139MM HG: CPT | Performed by: FAMILY MEDICINE

## 2021-01-06 PROCEDURE — 3008F BODY MASS INDEX DOCD: CPT | Performed by: FAMILY MEDICINE

## 2021-01-06 PROCEDURE — 3079F DIAST BP 80-89 MM HG: CPT | Performed by: FAMILY MEDICINE

## 2021-01-06 NOTE — PROGRESS NOTES
HPI:   Sade Isbell is a 63year old female that presents for   Itching and redness on front of neck for 2 wks now. Seems to be getting worse. Shingles shot 1 mth ago. Levothyroxine started 6 wks ago. Was on synthroid for 29 years.  Switched as of this encounter: 158 lb 6 oz (71.8 kg). Vital signs reviewed. Appears stated age, well groomed. Physical Exam:  GEN:  Patient is alert, awake and oriented, well developed, well nourished, no apparent distress.   HEENT:     Head:  Normocephalic, atrau

## 2021-01-08 LAB
T4, FREE: 1.3 NG/DL (ref 0.8–1.8)
TSH: 1.28 MIU/L (ref 0.4–4.5)

## 2021-01-13 ENCOUNTER — OFFICE VISIT (OUTPATIENT)
Dept: FAMILY MEDICINE CLINIC | Facility: CLINIC | Age: 66
End: 2021-01-13
Payer: COMMERCIAL

## 2021-01-13 VITALS
TEMPERATURE: 98 F | BODY MASS INDEX: 28 KG/M2 | RESPIRATION RATE: 16 BRPM | HEART RATE: 60 BPM | SYSTOLIC BLOOD PRESSURE: 116 MMHG | WEIGHT: 158 LBS | DIASTOLIC BLOOD PRESSURE: 78 MMHG

## 2021-01-13 DIAGNOSIS — L50.9 HIVES: ICD-10-CM

## 2021-01-13 DIAGNOSIS — E89.0 POSTSURGICAL HYPOTHYROIDISM: ICD-10-CM

## 2021-01-13 DIAGNOSIS — Z01.419 ENCOUNTER FOR ANNUAL ROUTINE GYNECOLOGICAL EXAMINATION: ICD-10-CM

## 2021-01-13 DIAGNOSIS — L30.9 DERMATITIS: Primary | ICD-10-CM

## 2021-01-13 PROCEDURE — 3078F DIAST BP <80 MM HG: CPT | Performed by: FAMILY MEDICINE

## 2021-01-13 PROCEDURE — 3074F SYST BP LT 130 MM HG: CPT | Performed by: FAMILY MEDICINE

## 2021-01-13 PROCEDURE — 99214 OFFICE O/P EST MOD 30 MIN: CPT | Performed by: FAMILY MEDICINE

## 2021-01-13 RX ORDER — LEVOTHYROXINE SODIUM 88 MCG
88 TABLET ORAL
Qty: 90 TABLET | Refills: 0 | Status: SHIPPED | OUTPATIENT
Start: 2021-01-13 | End: 2021-04-10

## 2021-01-14 NOTE — PROGRESS NOTES
HPI:   Art Sharma is a 63year old female that presents for follow-up of hives and pruritus on the anterior neck region. She had stopped her levothyroxine for the past 4 days. No symptoms of hypothyroidism.   However her hives and itching have go 16   Wt 158 lb (71.7 kg)   BMI 28.44 kg/m²  Estimated body mass index is 28.44 kg/m² as calculated from the following:    Height as of 1/6/21: 5' 2.5\" (1.588 m). Weight as of this encounter: 158 lb (71.7 kg). Vital signs reviewed. Appears stated age,

## 2021-01-20 ENCOUNTER — TELEPHONE (OUTPATIENT)
Dept: FAMILY MEDICINE CLINIC | Facility: CLINIC | Age: 66
End: 2021-01-20

## 2021-01-20 NOTE — TELEPHONE ENCOUNTER
Patient calling, asking for prior authorization for Synthroid. Patient had reaction to Levothyroxine, has ran out of medication. Please advise.

## 2021-01-26 NOTE — TELEPHONE ENCOUNTER
Aldo Farfan is calling to discuss prior auth for Brand name of refill . Lizabeth would like a call back to Ph. [phone]  Prior auth department.     [id number] - [place], [address] [place] [address] [facility], [phone]-

## 2021-02-03 NOTE — TELEPHONE ENCOUNTER
I called and spoke to patient about the Synthroid not being approved because it is on their exclusion plan. I advised she call her members services and put in a complaint.  She will do that and call me back with any questions

## 2021-02-03 NOTE — TELEPHONE ENCOUNTER
I called and spoke to Maia Mederos at [facility] in regards to a prior Auth for Brand Synthroid. I was told the brand synthroid is denied, it is a Plan Exclusion. Even with her side effects, the Brand will not be covered.

## 2021-02-16 ENCOUNTER — TELEPHONE (OUTPATIENT)
Dept: FAMILY MEDICINE CLINIC | Facility: CLINIC | Age: 66
End: 2021-02-16

## 2021-02-16 NOTE — TELEPHONE ENCOUNTER
Dr.Dongre- Adames is requesting lab order be faxed to [address] [place] [address] Fax [phone]  [phone]    Faxed/ts

## 2021-02-17 LAB
T4, FREE: 1.5 NG/DL (ref 0.8–1.8)
TSH: 1.88 MIU/L (ref 0.4–4.5)

## 2021-02-22 ENCOUNTER — OFFICE VISIT (OUTPATIENT)
Dept: FAMILY MEDICINE CLINIC | Facility: CLINIC | Age: 66
End: 2021-02-22
Payer: COMMERCIAL

## 2021-02-22 VITALS
HEIGHT: 62.5 IN | BODY MASS INDEX: 28.35 KG/M2 | RESPIRATION RATE: 16 BRPM | WEIGHT: 158 LBS | SYSTOLIC BLOOD PRESSURE: 122 MMHG | TEMPERATURE: 98 F | HEART RATE: 76 BPM | DIASTOLIC BLOOD PRESSURE: 78 MMHG

## 2021-02-22 DIAGNOSIS — F41.0 PANIC DISORDER: ICD-10-CM

## 2021-02-22 DIAGNOSIS — E03.9 HYPOTHYROIDISM, UNSPECIFIED TYPE: Primary | ICD-10-CM

## 2021-02-22 DIAGNOSIS — R23.2 HOT FLASHES: ICD-10-CM

## 2021-02-22 DIAGNOSIS — Z51.81 ENCOUNTER FOR THERAPEUTIC DRUG MONITORING: ICD-10-CM

## 2021-02-22 DIAGNOSIS — G43.709 CHRONIC MIGRAINE WITHOUT AURA WITHOUT STATUS MIGRAINOSUS, NOT INTRACTABLE: Chronic | ICD-10-CM

## 2021-02-22 PROCEDURE — 3074F SYST BP LT 130 MM HG: CPT | Performed by: FAMILY MEDICINE

## 2021-02-22 PROCEDURE — 3008F BODY MASS INDEX DOCD: CPT | Performed by: FAMILY MEDICINE

## 2021-02-22 PROCEDURE — 3078F DIAST BP <80 MM HG: CPT | Performed by: FAMILY MEDICINE

## 2021-02-22 PROCEDURE — 99214 OFFICE O/P EST MOD 30 MIN: CPT | Performed by: FAMILY MEDICINE

## 2021-02-22 RX ORDER — ALPRAZOLAM 0.25 MG/1
0.25 TABLET ORAL DAILY PRN
Qty: 20 TABLET | Refills: 0 | Status: SHIPPED | OUTPATIENT
Start: 2021-02-22

## 2021-02-22 RX ORDER — ELETRIPTAN HYDROBROMIDE 20 MG/1
TABLET, FILM COATED ORAL
Qty: 10 TABLET | Refills: 2 | Status: SHIPPED | OUTPATIENT
Start: 2021-02-22 | End: 2021-11-29

## 2021-02-22 NOTE — PROGRESS NOTES
HPI:   Alize Bashir is a 63year old female that presents for Patient presents with: Follow - Up: OV 1/06/2021 for thyroid and reaction to levothyroxine, she is on synthroid and T4 has been completed.    Medication Request: in 2017 she was prescribe unless noted in HPI    PHYSICAL EXAM:   /78   Pulse 76   Temp 98.2 °F (36.8 °C) (Temporal)   Resp 16   Ht 5' 2.5\" (1.588 m)   Wt 158 lb (71.7 kg)   BMI 28.44 kg/m²  Estimated body mass index is 28.44 kg/m² as calculated from the following:    Height tablet; Refill: 0    5. Encounter for therapeutic drug monitoring  - ALPRAZolam 0.25 MG Oral Tab; Take 1 tablet (0.25 mg total) by mouth daily as needed for Sleep or Anxiety. Dispense: 20 tablet;  Refill: 0        Thyroid labs are stable with T4 level 1.5

## 2021-04-10 RX ORDER — LEVOTHYROXINE SODIUM 88 MCG
88 TABLET ORAL
Qty: 90 TABLET | Refills: 1 | Status: SHIPPED | OUTPATIENT
Start: 2021-04-10 | End: 2021-11-29 | Stop reason: ALTCHOICE

## 2021-05-05 ENCOUNTER — NURSE ONLY (OUTPATIENT)
Dept: FAMILY MEDICINE CLINIC | Facility: CLINIC | Age: 66
End: 2021-05-05
Payer: COMMERCIAL

## 2021-05-05 DIAGNOSIS — Z23 NEED FOR SHINGLES VACCINE: Primary | ICD-10-CM

## 2021-05-05 PROCEDURE — 90750 HZV VACC RECOMBINANT IM: CPT | Performed by: FAMILY MEDICINE

## 2021-05-05 PROCEDURE — 90471 IMMUNIZATION ADMIN: CPT | Performed by: FAMILY MEDICINE

## 2021-05-05 NOTE — PROGRESS NOTES
Pt here for Shingrix #2, given in right deltoid. Pt tolerated well with no adverse events. VIS given to pt.

## 2021-06-23 ENCOUNTER — TELEPHONE (OUTPATIENT)
Dept: FAMILY MEDICINE CLINIC | Facility: CLINIC | Age: 66
End: 2021-06-23

## 2021-06-23 DIAGNOSIS — E03.9 HYPOTHYROIDISM, UNSPECIFIED TYPE: Primary | ICD-10-CM

## 2021-07-01 LAB
T4, FREE: 1.3 NG/DL (ref 0.8–1.8)
TSH: 0.82 MIU/L (ref 0.4–4.5)

## 2021-10-06 ENCOUNTER — OFFICE VISIT (OUTPATIENT)
Dept: FAMILY MEDICINE CLINIC | Facility: CLINIC | Age: 66
End: 2021-10-06
Payer: COMMERCIAL

## 2021-10-06 VITALS
TEMPERATURE: 98 F | WEIGHT: 157.81 LBS | RESPIRATION RATE: 18 BRPM | SYSTOLIC BLOOD PRESSURE: 116 MMHG | BODY MASS INDEX: 28.31 KG/M2 | HEART RATE: 72 BPM | DIASTOLIC BLOOD PRESSURE: 78 MMHG | HEIGHT: 62.5 IN

## 2021-10-06 DIAGNOSIS — E78.00 HYPERCHOLESTEROLEMIA: ICD-10-CM

## 2021-10-06 DIAGNOSIS — F41.9 ANXIETY: ICD-10-CM

## 2021-10-06 DIAGNOSIS — E55.9 VITAMIN D DEFICIENCY: ICD-10-CM

## 2021-10-06 DIAGNOSIS — Z00.00 ROUTINE ADULT HEALTH MAINTENANCE: Primary | ICD-10-CM

## 2021-10-06 DIAGNOSIS — Z23 NEED FOR VACCINATION: ICD-10-CM

## 2021-10-06 DIAGNOSIS — E03.9 HYPOTHYROIDISM, UNSPECIFIED TYPE: ICD-10-CM

## 2021-10-06 PROCEDURE — 3078F DIAST BP <80 MM HG: CPT | Performed by: FAMILY MEDICINE

## 2021-10-06 PROCEDURE — 99213 OFFICE O/P EST LOW 20 MIN: CPT | Performed by: FAMILY MEDICINE

## 2021-10-06 PROCEDURE — 3074F SYST BP LT 130 MM HG: CPT | Performed by: FAMILY MEDICINE

## 2021-10-06 PROCEDURE — 99396 PREV VISIT EST AGE 40-64: CPT | Performed by: FAMILY MEDICINE

## 2021-10-06 PROCEDURE — 3008F BODY MASS INDEX DOCD: CPT | Performed by: FAMILY MEDICINE

## 2021-10-06 RX ORDER — BETAMETHASONE DIPROPIONATE 0.5 MG/G
CREAM TOPICAL
COMMUNITY
Start: 2021-07-22

## 2021-10-06 RX ORDER — VENLAFAXINE 37.5 MG/1
37.5 TABLET ORAL EVERY MORNING
Qty: 30 TABLET | Refills: 0 | Status: SHIPPED | OUTPATIENT
Start: 2021-10-06 | End: 2021-11-29

## 2021-10-06 RX ORDER — LEVOTHYROXINE SODIUM 88 UG/1
100 TABLET ORAL DAILY
Qty: 90 TABLET | Refills: 3 | Status: SHIPPED | OUTPATIENT
Start: 2021-10-06 | End: 2022-01-04

## 2021-10-07 PROBLEM — E55.9 VITAMIN D DEFICIENCY: Status: ACTIVE | Noted: 2021-10-07

## 2021-10-07 PROBLEM — F41.9 ANXIETY: Status: ACTIVE | Noted: 2021-10-07

## 2021-10-07 NOTE — PROGRESS NOTES
HPI:   Eliazar Dailey is a 63year old female who presents for a complete physical exam.   Patient states of having anxiety episodes which occur on a frequent basis now. She denies having panic attacks or any depression or suicidal thoughts.   She wou Take 2,000 Units by mouth daily. • Multiple Vitamins-Minerals (MULTIVITAMIN ADULT OR) Take 1 tablet by mouth daily.         Past Medical History:   Diagnosis Date   • Breast cancer ([facility] [address]     thyroid removed   • H/O: hysterectomy    • Hot flashes, menopa 28.40 kg/m²   Body mass index is 28.4 kg/m².    GENERAL: well developed, well nourished and in no apparent distress  SKIN: no rashes,no suspicious lesions  HEENT: atraumatic, normocephalic,ears, nose and throat are normal  EYES: PERRLA, EOMI, sclera, conjun gynecologist.      Pt educated on immunizations and health maintenance appropriate for age. The patient verbalizes understanding of these recommendations and agrees to the plan. The patient is asked to return for complete physical yearly.       There

## 2021-10-14 ENCOUNTER — IMMUNIZATION (OUTPATIENT)
Dept: LAB | Facility: HOSPITAL | Age: 66
End: 2021-10-14
Attending: EMERGENCY MEDICINE
Payer: COMMERCIAL

## 2021-10-14 DIAGNOSIS — Z23 NEED FOR VACCINATION: Primary | ICD-10-CM

## 2021-10-14 PROCEDURE — 0013A SARSCOV2 VAC 100MCG/0.5ML IM: CPT

## 2021-11-29 ENCOUNTER — OFFICE VISIT (OUTPATIENT)
Dept: FAMILY MEDICINE CLINIC | Facility: CLINIC | Age: 66
End: 2021-11-29
Payer: COMMERCIAL

## 2021-11-29 VITALS
WEIGHT: 157.5 LBS | BODY MASS INDEX: 28.26 KG/M2 | RESPIRATION RATE: 16 BRPM | HEIGHT: 62.5 IN | SYSTOLIC BLOOD PRESSURE: 138 MMHG | TEMPERATURE: 97 F | HEART RATE: 88 BPM | DIASTOLIC BLOOD PRESSURE: 70 MMHG

## 2021-11-29 DIAGNOSIS — E78.00 HYPERCHOLESTEROLEMIA: Primary | ICD-10-CM

## 2021-11-29 DIAGNOSIS — E03.9 HYPOTHYROIDISM, UNSPECIFIED TYPE: ICD-10-CM

## 2021-11-29 DIAGNOSIS — E55.9 VITAMIN D DEFICIENCY: ICD-10-CM

## 2021-11-29 DIAGNOSIS — G43.709 CHRONIC MIGRAINE WITHOUT AURA WITHOUT STATUS MIGRAINOSUS, NOT INTRACTABLE: Chronic | ICD-10-CM

## 2021-11-29 PROCEDURE — 3075F SYST BP GE 130 - 139MM HG: CPT | Performed by: FAMILY MEDICINE

## 2021-11-29 PROCEDURE — 3078F DIAST BP <80 MM HG: CPT | Performed by: FAMILY MEDICINE

## 2021-11-29 PROCEDURE — 3008F BODY MASS INDEX DOCD: CPT | Performed by: FAMILY MEDICINE

## 2021-11-29 PROCEDURE — 99214 OFFICE O/P EST MOD 30 MIN: CPT | Performed by: FAMILY MEDICINE

## 2021-11-29 RX ORDER — ELETRIPTAN HYDROBROMIDE 20 MG/1
TABLET, FILM COATED ORAL
Qty: 10 TABLET | Refills: 2 | Status: SHIPPED | OUTPATIENT
Start: 2021-11-29

## 2021-11-29 NOTE — PROGRESS NOTES
HPI:   Art Sharma is a 61year old female that presents for Patient presents with:  Lab Results: completed 11/22 - would like to discuss  Other: had recent mammogram in [facility] [place] [address] Dr. Wing Osgood        Past medical, surgical, family and soci Estimated body mass index is 28.35 kg/m² as calculated from the following:    Height as of this encounter: 5' 2.5\" (1.588 m). Weight as of this encounter: 157 lb 8 oz (71.4 kg). Vital signs reviewed. Appears stated age, well groomed.   Physical Exam: Hypercholesterolemia    3. Hypothyroidism, unspecified type    4. Vitamin D deficiency    Lab work reviewed and cholesterol is slightly worse than last year.   Patient has been taking her statin every other day and advised her to try to take at least 5 days

## 2022-01-12 ENCOUNTER — TELEPHONE (OUTPATIENT)
Dept: FAMILY MEDICINE CLINIC | Facility: CLINIC | Age: 67
End: 2022-01-12

## 2022-01-12 NOTE — TELEPHONE ENCOUNTER
Recd request from [facility], [address], [place], [address] with phone [phone] and fax [phone] for last labs. Faxed labs from 11/22/2021 to above fax number.

## 2022-01-24 DIAGNOSIS — E78.00 HIGH CHOLESTEROL: ICD-10-CM

## 2022-01-24 RX ORDER — ATORVASTATIN CALCIUM 10 MG/1
10 TABLET, FILM COATED ORAL DAILY
Qty: 90 TABLET | Refills: 0 | Status: SHIPPED | OUTPATIENT
Start: 2022-01-24

## 2022-01-24 NOTE — TELEPHONE ENCOUNTER
Lazarus Ravel- Pt is calling to follow up on a request she placed at the pharmacy last week.     [facility] #[id number] - [place], [address] [place] [address] AT [address] [phone], [phone]       Additional Information    Associate

## 2022-05-13 ENCOUNTER — PATIENT MESSAGE (OUTPATIENT)
Dept: FAMILY MEDICINE CLINIC | Facility: CLINIC | Age: 67
End: 2022-05-13

## 2022-05-13 LAB — AMB EXT COVID-19 RESULT: DETECTED

## 2022-05-13 RX ORDER — ATORVASTATIN CALCIUM 10 MG/1
10 TABLET, FILM COATED ORAL DAILY
Qty: 90 TABLET | Refills: 0 | Status: SHIPPED | OUTPATIENT
Start: 2022-05-13

## 2022-05-13 NOTE — TELEPHONE ENCOUNTER
From: Delorise Specter  To: eHlen Schaefer MD  Sent: 5/13/2022 10:11 AM CDT  Subject: Covid positive    I have tested positive for covid this morning. I have had a sore throat and runny nose for two days. Took 2 at home test today, which were positive. Had vaccines and booster shot was 7 months ago. any suggests for me?  Lizett Yee

## 2022-06-07 LAB
ALBUMIN/GLOBULIN RATIO: 1.7 (CALC) (ref 1–2.5)
ALBUMIN: 4.3 G/DL (ref 3.6–5.1)
ALKALINE PHOSPHATASE: 60 U/L (ref 37–153)
ALT: 18 U/L (ref 6–29)
AST: 20 U/L (ref 10–35)
BILIRUBIN, TOTAL: 0.4 MG/DL (ref 0.2–1.2)
BUN: 14 MG/DL (ref 7–25)
CALCIUM: 9.5 MG/DL (ref 8.6–10.4)
CARBON DIOXIDE: 30 MMOL/L (ref 20–32)
CHLORIDE: 104 MMOL/L (ref 98–110)
CHOL/HDLC RATIO: 2.7 (CALC)
CHOLESTEROL, TOTAL: 217 MG/DL
CREATININE: 0.86 MG/DL (ref 0.5–0.99)
EGFR IF AFRICN AM: 83 ML/MIN/1.73M2
EGFR IF NONAFRICN AM: 71 ML/MIN/1.73M2
GLOBULIN: 2.5 G/DL (CALC) (ref 1.9–3.7)
GLUCOSE: 81 MG/DL (ref 65–99)
HDL CHOLESTEROL: 81 MG/DL
LDL-CHOLESTEROL: 122 MG/DL (CALC)
NON-HDL CHOLESTEROL: 136 MG/DL (CALC)
POTASSIUM: 4.4 MMOL/L (ref 3.5–5.3)
PROTEIN, TOTAL: 6.8 G/DL (ref 6.1–8.1)
SODIUM: 139 MMOL/L (ref 135–146)
T4, FREE: 1.4 NG/DL (ref 0.8–1.8)
TRIGLYCERIDES: 47 MG/DL
TSH: 2.49 MIU/L (ref 0.4–4.5)
VITAMIN D, 25-OH, TOTAL: 45 NG/ML (ref 30–100)

## 2022-09-16 DIAGNOSIS — E03.9 HYPOTHYROIDISM, UNSPECIFIED TYPE: ICD-10-CM

## 2022-09-16 RX ORDER — LEVOTHYROXINE SODIUM 88 UG/1
TABLET ORAL
Qty: 90 TABLET | Refills: 0 | Status: SHIPPED | OUTPATIENT
Start: 2022-09-16

## 2022-10-12 ENCOUNTER — OFFICE VISIT (OUTPATIENT)
Dept: FAMILY MEDICINE CLINIC | Facility: CLINIC | Age: 67
End: 2022-10-12
Payer: COMMERCIAL

## 2022-10-12 VITALS
RESPIRATION RATE: 16 BRPM | TEMPERATURE: 97 F | WEIGHT: 159.25 LBS | HEART RATE: 74 BPM | DIASTOLIC BLOOD PRESSURE: 70 MMHG | OXYGEN SATURATION: 98 % | SYSTOLIC BLOOD PRESSURE: 124 MMHG | BODY MASS INDEX: 28.57 KG/M2 | HEIGHT: 62.5 IN

## 2022-10-12 DIAGNOSIS — Z23 NEED FOR VACCINATION: Primary | ICD-10-CM

## 2022-10-12 PROCEDURE — 90471 IMMUNIZATION ADMIN: CPT | Performed by: FAMILY MEDICINE

## 2022-10-12 PROCEDURE — 99396 PREV VISIT EST AGE 40-64: CPT | Performed by: FAMILY MEDICINE

## 2022-10-12 PROCEDURE — 3008F BODY MASS INDEX DOCD: CPT | Performed by: FAMILY MEDICINE

## 2022-10-12 PROCEDURE — 90686 IIV4 VACC NO PRSV 0.5 ML IM: CPT | Performed by: FAMILY MEDICINE

## 2022-10-12 PROCEDURE — 3074F SYST BP LT 130 MM HG: CPT | Performed by: FAMILY MEDICINE

## 2022-10-12 PROCEDURE — 3078F DIAST BP <80 MM HG: CPT | Performed by: FAMILY MEDICINE

## 2022-12-19 DIAGNOSIS — E03.9 HYPOTHYROIDISM, UNSPECIFIED TYPE: ICD-10-CM

## 2022-12-19 RX ORDER — LEVOTHYROXINE SODIUM 88 UG/1
88 TABLET ORAL DAILY
Qty: 90 TABLET | Refills: 0 | Status: SHIPPED | OUTPATIENT
Start: 2022-12-19

## 2023-03-15 ENCOUNTER — TELEPHONE (OUTPATIENT)
Dept: FAMILY MEDICINE CLINIC | Facility: CLINIC | Age: 68
End: 2023-03-15

## 2023-03-15 DIAGNOSIS — E03.9 HYPOTHYROIDISM, UNSPECIFIED TYPE: Primary | ICD-10-CM

## 2023-03-15 DIAGNOSIS — E78.00 HIGH CHOLESTEROL: ICD-10-CM

## 2023-03-15 NOTE — TELEPHONE ENCOUNTER
Dr.Dongre- Adames is scheduling an appointment to follow up for thyroid medication refill. Can lab orders be placed to External lab.     Future Appointments   Date Time Provider Howard Saez   4/3/2023  9:00 AM BETTY Cai [facility] 20 [facility] [address]

## 2023-03-23 LAB
ALBUMIN/GLOBULIN RATIO: 1.7 (CALC) (ref 1–2.5)
ALBUMIN: 4.4 G/DL (ref 3.6–5.1)
ALKALINE PHOSPHATASE: 63 U/L (ref 37–153)
ALT: 16 U/L (ref 6–29)
AST: 18 U/L (ref 10–35)
BILIRUBIN, TOTAL: 0.5 MG/DL (ref 0.2–1.2)
BUN: 16 MG/DL (ref 7–25)
CALCIUM: 9.6 MG/DL (ref 8.6–10.4)
CARBON DIOXIDE: 30 MMOL/L (ref 20–32)
CHLORIDE: 101 MMOL/L (ref 98–110)
CHOL/HDLC RATIO: 3.4 (CALC)
CHOLESTEROL, TOTAL: 300 MG/DL
CREATININE: 0.86 MG/DL (ref 0.5–1.05)
EGFR: 75 ML/MIN/1.73M2
GLOBULIN: 2.6 G/DL (CALC) (ref 1.9–3.7)
GLUCOSE: 93 MG/DL (ref 65–99)
HDL CHOLESTEROL: 88 MG/DL
LDL-CHOLESTEROL: 194 MG/DL (CALC)
NON-HDL CHOLESTEROL: 212 MG/DL (CALC)
POTASSIUM: 4.1 MMOL/L (ref 3.5–5.3)
PROTEIN, TOTAL: 7 G/DL (ref 6.1–8.1)
SODIUM: 139 MMOL/L (ref 135–146)
T4, FREE: 1.3 NG/DL (ref 0.8–1.8)
TRIGLYCERIDES: 78 MG/DL
TSH: 2.18 MIU/L (ref 0.4–4.5)

## 2023-03-28 DIAGNOSIS — E78.00 HIGH CHOLESTEROL: Primary | ICD-10-CM

## 2023-04-03 ENCOUNTER — OFFICE VISIT (OUTPATIENT)
Dept: FAMILY MEDICINE CLINIC | Facility: CLINIC | Age: 68
End: 2023-04-03
Payer: MEDICARE

## 2023-04-03 VITALS
HEART RATE: 84 BPM | OXYGEN SATURATION: 97 % | TEMPERATURE: 98 F | RESPIRATION RATE: 16 BRPM | HEIGHT: 62.5 IN | SYSTOLIC BLOOD PRESSURE: 120 MMHG | DIASTOLIC BLOOD PRESSURE: 70 MMHG | BODY MASS INDEX: 28.53 KG/M2 | WEIGHT: 159 LBS

## 2023-04-03 DIAGNOSIS — E03.9 HYPOTHYROIDISM, UNSPECIFIED TYPE: ICD-10-CM

## 2023-04-03 DIAGNOSIS — G43.709 CHRONIC MIGRAINE WITHOUT AURA WITHOUT STATUS MIGRAINOSUS, NOT INTRACTABLE: Chronic | ICD-10-CM

## 2023-04-03 RX ORDER — LEVOTHYROXINE SODIUM 88 UG/1
88 TABLET ORAL DAILY
Qty: 90 TABLET | Refills: 0 | Status: SHIPPED | OUTPATIENT
Start: 2023-04-03

## 2023-04-03 RX ORDER — ELETRIPTAN HYDROBROMIDE 20 MG/1
TABLET, FILM COATED ORAL
Qty: 10 TABLET | Refills: 2 | Status: SHIPPED | OUTPATIENT
Start: 2023-04-03

## 2023-04-05 ENCOUNTER — TELEPHONE (OUTPATIENT)
Dept: FAMILY MEDICINE CLINIC | Facility: CLINIC | Age: 68
End: 2023-04-05

## 2023-04-05 NOTE — TELEPHONE ENCOUNTER
Patient states her insurance doesn't cover Rizatriptan, wants to know if she can get Sumatriptan instead? Please advise.

## 2023-04-24 ENCOUNTER — TELEPHONE (OUTPATIENT)
Dept: FAMILY MEDICINE CLINIC | Facility: CLINIC | Age: 68
End: 2023-04-24

## 2023-04-24 NOTE — TELEPHONE ENCOUNTER
Patient outreach- Left message to reschedule 10/16/2023 appt. PCP is not available at time patient is scheduled.

## 2023-06-28 ENCOUNTER — OFFICE VISIT (OUTPATIENT)
Dept: FAMILY MEDICINE CLINIC | Facility: CLINIC | Age: 68
End: 2023-06-28
Payer: MEDICARE

## 2023-06-28 VITALS
RESPIRATION RATE: 16 BRPM | BODY MASS INDEX: 28.91 KG/M2 | TEMPERATURE: 97 F | HEART RATE: 117 BPM | DIASTOLIC BLOOD PRESSURE: 80 MMHG | SYSTOLIC BLOOD PRESSURE: 130 MMHG | OXYGEN SATURATION: 98 % | WEIGHT: 161.13 LBS | HEIGHT: 62.5 IN

## 2023-06-28 DIAGNOSIS — R03.0 ELEVATED BP WITHOUT DIAGNOSIS OF HYPERTENSION: ICD-10-CM

## 2023-06-28 DIAGNOSIS — Z28.21 PNEUMOCOCCAL VACCINE REFUSED: ICD-10-CM

## 2023-06-28 DIAGNOSIS — Z51.81 MEDICATION MONITORING ENCOUNTER: ICD-10-CM

## 2023-06-28 DIAGNOSIS — H54.7 DECREASED VISUAL ACUITY: ICD-10-CM

## 2023-06-28 DIAGNOSIS — E78.00 PURE HYPERCHOLESTEROLEMIA: ICD-10-CM

## 2023-06-28 DIAGNOSIS — E89.0 POSTSURGICAL HYPOTHYROIDISM: ICD-10-CM

## 2023-06-28 DIAGNOSIS — F41.9 ANXIETY: ICD-10-CM

## 2023-06-28 DIAGNOSIS — Z00.00 WELL ADULT EXAM: Primary | ICD-10-CM

## 2023-06-28 PROCEDURE — G0402 INITIAL PREVENTIVE EXAM: HCPCS | Performed by: STUDENT IN AN ORGANIZED HEALTH CARE EDUCATION/TRAINING PROGRAM

## 2023-06-28 RX ORDER — ALPRAZOLAM 0.25 MG/1
0.25 TABLET ORAL DAILY PRN
Qty: 20 TABLET | Refills: 0 | Status: SHIPPED | OUTPATIENT
Start: 2023-06-28 | End: 2023-07-18

## 2023-06-28 RX ORDER — LEVOTHYROXINE SODIUM 88 UG/1
88 TABLET ORAL DAILY
Qty: 90 TABLET | Refills: 0 | Status: SHIPPED | OUTPATIENT
Start: 2023-06-28

## 2023-09-25 DIAGNOSIS — E89.0 POSTSURGICAL HYPOTHYROIDISM: ICD-10-CM

## 2023-09-25 RX ORDER — LEVOTHYROXINE SODIUM 88 UG/1
88 TABLET ORAL DAILY
Qty: 90 TABLET | Refills: 0 | Status: SHIPPED | OUTPATIENT
Start: 2023-09-25

## 2023-09-25 NOTE — TELEPHONE ENCOUNTER
Requested Renewals     Name from pharmacy: LEVOTHYROXINE 0.088MG (88MCG) TAB         Will file in chart as: LEVOTHYROXINE 88 MCG Oral Tab    Sig: TAKE 1 TABLET(88 MCG) BY MOUTH DAILY    Disp: 90 tablet    Refills: 0 (Pharmacy requested: Not specified)    Start: 9/25/2023    Class: Normal    Non-formulary For: Postsurgical hypothyroidism    Last ordered: 2 months ago (6/28/2023) by Hillary Grullon MD    Last refill: 6/28/2023    Rx #: [id number]    Thyroid Supplements Protocol [id number]/25/2023 06:06 AM   Protocol Details TSH test in past 12 months    TSH value between 0.350 and 5.500 IU/ml    Appointment in past 12 or next 3 months             No future appointments. LOV: 6/28/23 for wellness visit, Medicare  RTC: 3 months  Labs have been ordered to be done in 3 months from [place]ette visit. [employer] message sent to patient to schedule an appt for a 3 month f/up and to have labs done beforehand. RX sent.

## 2023-10-23 ENCOUNTER — OFFICE VISIT (OUTPATIENT)
Dept: FAMILY MEDICINE CLINIC | Facility: CLINIC | Age: 68
End: 2023-10-23
Payer: MEDICARE

## 2023-10-23 VITALS
TEMPERATURE: 98 F | HEART RATE: 86 BPM | SYSTOLIC BLOOD PRESSURE: 160 MMHG | DIASTOLIC BLOOD PRESSURE: 90 MMHG | OXYGEN SATURATION: 100 %

## 2023-10-23 DIAGNOSIS — N30.00 ACUTE CYSTITIS WITHOUT HEMATURIA: Primary | ICD-10-CM

## 2023-10-23 DIAGNOSIS — R39.9 UTI SYMPTOMS: ICD-10-CM

## 2023-10-23 LAB
APPEARANCE: CLEAR
BILIRUBIN: NEGATIVE
GLUCOSE (URINE DIPSTICK): NEGATIVE MG/DL
KETONES (URINE DIPSTICK): NEGATIVE MG/DL
MULTISTIX LOT#: ABNORMAL NUMERIC
NITRITE, URINE: NEGATIVE
PH, URINE: 7 (ref 4.5–8)
PROTEIN (URINE DIPSTICK): NEGATIVE MG/DL
SPECIFIC GRAVITY: 1.01 (ref 1–1.03)
URINE-COLOR: YELLOW
UROBILINOGEN,SEMI-QN: 0.2 MG/DL (ref 0–1.9)

## 2023-10-23 PROCEDURE — 99213 OFFICE O/P EST LOW 20 MIN: CPT | Performed by: NURSE PRACTITIONER

## 2023-10-23 PROCEDURE — 87086 URINE CULTURE/COLONY COUNT: CPT | Performed by: NURSE PRACTITIONER

## 2023-10-23 PROCEDURE — 81003 URINALYSIS AUTO W/O SCOPE: CPT | Performed by: NURSE PRACTITIONER

## 2023-10-23 RX ORDER — NITROFURANTOIN 25; 75 MG/1; MG/1
100 CAPSULE ORAL 2 TIMES DAILY
Qty: 14 CAPSULE | Refills: 0 | Status: SHIPPED | OUTPATIENT
Start: 2023-10-23 | End: 2023-10-30

## 2023-10-23 NOTE — PATIENT INSTRUCTIONS
Continue to drink increased amount of water . Start antibiotic today. Urine culture will be sent out. If nothing grows out of culture, antibiotic can  be discontinued. However, if you are experiencing relief of symptoms with antibiotic continue and complete medication. If symptoms do not resolve or worsen after 3 days notify provider.

## 2023-12-07 ENCOUNTER — TELEPHONE (OUTPATIENT)
Dept: FAMILY MEDICINE CLINIC | Facility: CLINIC | Age: 68
End: 2023-12-07

## 2023-12-07 DIAGNOSIS — Z00.00 LABORATORY EXAMINATION ORDERED AS PART OF A ROUTINE GENERAL MEDICAL EXAMINATION: Primary | ICD-10-CM

## 2023-12-07 DIAGNOSIS — E89.0 HX OF THYROIDECTOMY: ICD-10-CM

## 2023-12-07 NOTE — TELEPHONE ENCOUNTER
Future Appointments   Date Time Provider Howard Cannoni   12/11/2023 11:10 AM Jennifer Grande MD [facility] [address]   7/11/2024  9:30 AM Jennifer Grande MD EM[facility] 20 [facility] [address]     Patient requesting lab orders to be changed to [facility] lab system.  Will like to do labs prior to appointment on 12-11

## 2023-12-08 ENCOUNTER — LAB ENCOUNTER (OUTPATIENT)
Dept: LAB | Age: 68
End: 2023-12-08
Attending: STUDENT IN AN ORGANIZED HEALTH CARE EDUCATION/TRAINING PROGRAM
Payer: MEDICARE

## 2023-12-08 DIAGNOSIS — Z00.00 LABORATORY EXAMINATION ORDERED AS PART OF A ROUTINE GENERAL MEDICAL EXAMINATION: ICD-10-CM

## 2023-12-08 DIAGNOSIS — E89.0 HX OF THYROIDECTOMY: ICD-10-CM

## 2023-12-08 LAB
ALBUMIN SERPL-MCNC: 3.9 G/DL (ref 3.4–5)
ALBUMIN/GLOB SERPL: 1.1 {RATIO} (ref 1–2)
ALP LIVER SERPL-CCNC: 66 U/L
ALT SERPL-CCNC: 23 U/L
ANION GAP SERPL CALC-SCNC: 3 MMOL/L (ref 0–18)
AST SERPL-CCNC: 18 U/L (ref 15–37)
BASOPHILS # BLD AUTO: 0.02 X10(3) UL (ref 0–0.2)
BASOPHILS NFR BLD AUTO: 0.5 %
BILIRUB SERPL-MCNC: 0.4 MG/DL (ref 0.1–2)
BUN BLD-MCNC: 15 MG/DL (ref 9–23)
CALCIUM BLD-MCNC: 9.3 MG/DL (ref 8.5–10.1)
CHLORIDE SERPL-SCNC: 105 MMOL/L (ref 98–112)
CHOLEST SERPL-MCNC: 252 MG/DL (ref ?–200)
CO2 SERPL-SCNC: 30 MMOL/L (ref 21–32)
CREAT BLD-MCNC: 0.88 MG/DL
EGFRCR SERPLBLD CKD-EPI 2021: 72 ML/MIN/1.73M2 (ref 60–?)
EOSINOPHIL # BLD AUTO: 0.11 X10(3) UL (ref 0–0.7)
EOSINOPHIL NFR BLD AUTO: 2.9 %
ERYTHROCYTE [DISTWIDTH] IN BLOOD BY AUTOMATED COUNT: 13.1 %
FASTING PATIENT LIPID ANSWER: YES
FASTING STATUS PATIENT QL REPORTED: YES
GLOBULIN PLAS-MCNC: 3.4 G/DL (ref 2.8–4.4)
GLUCOSE BLD-MCNC: 87 MG/DL (ref 70–99)
HCT VFR BLD AUTO: 41.1 %
HDLC SERPL-MCNC: 79 MG/DL (ref 40–59)
HGB BLD-MCNC: 13.2 G/DL
IMM GRANULOCYTES # BLD AUTO: 0.01 X10(3) UL (ref 0–1)
IMM GRANULOCYTES NFR BLD: 0.3 %
LDLC SERPL CALC-MCNC: 164 MG/DL (ref ?–100)
LYMPHOCYTES # BLD AUTO: 1.69 X10(3) UL (ref 1–4)
LYMPHOCYTES NFR BLD AUTO: 45.3 %
MCH RBC QN AUTO: 29.3 PG (ref 26–34)
MCHC RBC AUTO-ENTMCNC: 32.1 G/DL (ref 31–37)
MCV RBC AUTO: 91.1 FL
MONOCYTES # BLD AUTO: 0.41 X10(3) UL (ref 0.1–1)
MONOCYTES NFR BLD AUTO: 11 %
NEUTROPHILS # BLD AUTO: 1.49 X10 (3) UL (ref 1.5–7.7)
NEUTROPHILS # BLD AUTO: 1.49 X10(3) UL (ref 1.5–7.7)
NEUTROPHILS NFR BLD AUTO: 40 %
NONHDLC SERPL-MCNC: 173 MG/DL (ref ?–130)
OSMOLALITY SERPL CALC.SUM OF ELEC: 286 MOSM/KG (ref 275–295)
PLATELET # BLD AUTO: 204 10(3)UL (ref 150–450)
POTASSIUM SERPL-SCNC: 4 MMOL/L (ref 3.5–5.1)
PROT SERPL-MCNC: 7.3 G/DL (ref 6.4–8.2)
RBC # BLD AUTO: 4.51 X10(6)UL
SODIUM SERPL-SCNC: 138 MMOL/L (ref 136–145)
TRIGL SERPL-MCNC: 55 MG/DL (ref 30–149)
TSI SER-ACNC: 0.69 MIU/ML (ref 0.36–3.74)
VLDLC SERPL CALC-MCNC: 11 MG/DL (ref 0–30)
WBC # BLD AUTO: 3.7 X10(3) UL (ref 4–11)

## 2023-12-08 PROCEDURE — 80053 COMPREHEN METABOLIC PANEL: CPT | Performed by: STUDENT IN AN ORGANIZED HEALTH CARE EDUCATION/TRAINING PROGRAM

## 2023-12-08 PROCEDURE — 85025 COMPLETE CBC W/AUTO DIFF WBC: CPT

## 2023-12-08 PROCEDURE — 36415 COLL VENOUS BLD VENIPUNCTURE: CPT | Performed by: STUDENT IN AN ORGANIZED HEALTH CARE EDUCATION/TRAINING PROGRAM

## 2023-12-08 PROCEDURE — 80061 LIPID PANEL: CPT | Performed by: STUDENT IN AN ORGANIZED HEALTH CARE EDUCATION/TRAINING PROGRAM

## 2023-12-08 PROCEDURE — 84443 ASSAY THYROID STIM HORMONE: CPT | Performed by: STUDENT IN AN ORGANIZED HEALTH CARE EDUCATION/TRAINING PROGRAM

## 2023-12-11 ENCOUNTER — OFFICE VISIT (OUTPATIENT)
Dept: FAMILY MEDICINE CLINIC | Facility: CLINIC | Age: 68
End: 2023-12-11
Payer: MEDICARE

## 2023-12-11 VITALS
HEART RATE: 76 BPM | HEIGHT: 62.5 IN | RESPIRATION RATE: 16 BRPM | BODY MASS INDEX: 28.24 KG/M2 | SYSTOLIC BLOOD PRESSURE: 130 MMHG | OXYGEN SATURATION: 98 % | TEMPERATURE: 97 F | DIASTOLIC BLOOD PRESSURE: 80 MMHG | WEIGHT: 157.38 LBS

## 2023-12-11 DIAGNOSIS — I10 PRIMARY HYPERTENSION: ICD-10-CM

## 2023-12-11 DIAGNOSIS — E78.00 PURE HYPERCHOLESTEROLEMIA: Primary | ICD-10-CM

## 2023-12-11 DIAGNOSIS — Z51.81 MEDICATION MONITORING ENCOUNTER: ICD-10-CM

## 2023-12-11 DIAGNOSIS — E89.0 POSTSURGICAL HYPOTHYROIDISM: ICD-10-CM

## 2023-12-11 PROCEDURE — 99214 OFFICE O/P EST MOD 30 MIN: CPT | Performed by: STUDENT IN AN ORGANIZED HEALTH CARE EDUCATION/TRAINING PROGRAM

## 2023-12-11 RX ORDER — LEVOTHYROXINE SODIUM 88 UG/1
88 TABLET ORAL DAILY
Qty: 90 TABLET | Refills: 1 | Status: SHIPPED | OUTPATIENT
Start: 2023-12-11

## 2023-12-11 RX ORDER — PRAVASTATIN SODIUM 40 MG
40 TABLET ORAL NIGHTLY
Qty: 30 TABLET | Refills: 5 | Status: SHIPPED | OUTPATIENT
Start: 2023-12-11

## 2024-02-22 ENCOUNTER — PATIENT MESSAGE (OUTPATIENT)
Dept: FAMILY MEDICINE CLINIC | Facility: CLINIC | Age: 69
End: 2024-02-22

## 2024-02-22 NOTE — TELEPHONE ENCOUNTER
From: Dena Glass  To: Meagan Ty  Sent: 2/22/2024 12:06 PM CST  Subject: Pravastatin    Hello! I have been taking Pravastatin for about 2 months now. I have experienced stomach pain and it seemed to be getting worse. I went of the medication for about 2 weeks now and the pain is gone. Could there be another statin that could not give me that side effect? No other side effect but stomach pain. Thank you! Dena Glass

## 2024-03-08 ENCOUNTER — TELEPHONE (OUTPATIENT)
Dept: FAMILY MEDICINE CLINIC | Facility: CLINIC | Age: 69
End: 2024-03-08

## 2024-03-08 RX ORDER — PRAVASTATIN SODIUM 20 MG
20 TABLET ORAL NIGHTLY
Qty: 30 TABLET | Refills: 0 | Status: SHIPPED | OUTPATIENT
Start: 2024-03-08 | End: 2024-03-08

## 2024-03-08 RX ORDER — PRAVASTATIN SODIUM 20 MG
20 TABLET ORAL NIGHTLY
Qty: 90 TABLET | Refills: 0 | Status: SHIPPED | OUTPATIENT
Start: 2024-03-08

## 2024-03-08 NOTE — TELEPHONE ENCOUNTER
Patient calling on Pravastatin prescription, doctor was going to switch to lower dose. Patient waiting on medication, did not hear back since last message from 2-23.

## 2024-06-04 DIAGNOSIS — E89.0 POSTSURGICAL HYPOTHYROIDISM: ICD-10-CM

## 2024-06-04 RX ORDER — LEVOTHYROXINE SODIUM 88 UG/1
88 TABLET ORAL DAILY
Qty: 90 TABLET | Refills: 0 | Status: SHIPPED | OUTPATIENT
Start: 2024-06-04

## 2024-06-04 NOTE — TELEPHONE ENCOUNTER
Requested Renewals     Name from pharmacy: LEVOTHYROXINE 0.088MG (88MCG) TAB         Will file in chart as: LEVOTHYROXINE 88 MCG Oral Tab    Sig: TAKE 1 TABLET(88 MCG) BY MOUTH DAILY    Disp: 90 tablet    Refills: 1 (Pharmacy requested: Not specified)    Start: 6/4/2024    Class: Normal    Non-formulary For: Postsurgical hypothyroidism    Last ordered: 5 months ago (12/11/2023) by Meagan Ty MD    Last refill: 3/6/2024    Rx #: [id number]    Thyroid Medication Protocol [id number]/04/2024 06:02 AM   Protocol Details TSH in past 12 months    Last TSH value is normal    In person appointment or virtual visit in the past 12 mos or appointment in next 3 mos             Future Appointments   Date Time Provider Department Center   7/11/2024  9:30 AM Meagan Ty MD [facility] 20 [facility] [address]     LOV; 12/11/23  RTC: 6 months  Labs done 12/8/23  RX sent.

## 2024-07-03 ENCOUNTER — LAB ENCOUNTER (OUTPATIENT)
Dept: LAB | Age: 69
End: 2024-07-03
Attending: STUDENT IN AN ORGANIZED HEALTH CARE EDUCATION/TRAINING PROGRAM
Payer: MEDICARE

## 2024-07-03 DIAGNOSIS — E89.0 POSTSURGICAL HYPOTHYROIDISM: ICD-10-CM

## 2024-07-03 DIAGNOSIS — Z51.81 MEDICATION MONITORING ENCOUNTER: ICD-10-CM

## 2024-07-03 DIAGNOSIS — E78.00 PURE HYPERCHOLESTEROLEMIA: ICD-10-CM

## 2024-07-03 LAB
ALBUMIN SERPL-MCNC: 3.6 G/DL (ref 3.4–5)
ALBUMIN/GLOB SERPL: 1 {RATIO} (ref 1–2)
ALP LIVER SERPL-CCNC: 65 U/L
ALT SERPL-CCNC: 25 U/L
ANION GAP SERPL CALC-SCNC: 4 MMOL/L (ref 0–18)
AST SERPL-CCNC: 22 U/L (ref 15–37)
BILIRUB SERPL-MCNC: 0.5 MG/DL (ref 0.1–2)
BUN BLD-MCNC: 18 MG/DL (ref 9–23)
CALCIUM BLD-MCNC: 9.2 MG/DL (ref 8.5–10.1)
CHLORIDE SERPL-SCNC: 104 MMOL/L (ref 98–112)
CHOLEST SERPL-MCNC: 248 MG/DL (ref ?–200)
CO2 SERPL-SCNC: 29 MMOL/L (ref 21–32)
CREAT BLD-MCNC: 1.04 MG/DL
EGFRCR SERPLBLD CKD-EPI 2021: 59 ML/MIN/1.73M2 (ref 60–?)
FASTING PATIENT LIPID ANSWER: YES
FASTING STATUS PATIENT QL REPORTED: YES
GLOBULIN PLAS-MCNC: 3.7 G/DL (ref 2.8–4.4)
GLUCOSE BLD-MCNC: 84 MG/DL (ref 70–99)
HDLC SERPL-MCNC: 84 MG/DL (ref 40–59)
LDLC SERPL CALC-MCNC: 157 MG/DL (ref ?–100)
NONHDLC SERPL-MCNC: 164 MG/DL (ref ?–130)
OSMOLALITY SERPL CALC.SUM OF ELEC: 285 MOSM/KG (ref 275–295)
POTASSIUM SERPL-SCNC: 4.1 MMOL/L (ref 3.5–5.1)
PROT SERPL-MCNC: 7.3 G/DL (ref 6.4–8.2)
SODIUM SERPL-SCNC: 137 MMOL/L (ref 136–145)
TRIGL SERPL-MCNC: 46 MG/DL (ref 30–149)
TSI SER-ACNC: 2.13 MIU/ML (ref 0.36–3.74)
VLDLC SERPL CALC-MCNC: 9 MG/DL (ref 0–30)

## 2024-07-03 PROCEDURE — 84443 ASSAY THYROID STIM HORMONE: CPT

## 2024-07-03 PROCEDURE — 80053 COMPREHEN METABOLIC PANEL: CPT

## 2024-07-03 PROCEDURE — 36415 COLL VENOUS BLD VENIPUNCTURE: CPT

## 2024-07-03 PROCEDURE — 80061 LIPID PANEL: CPT

## 2024-07-11 ENCOUNTER — OFFICE VISIT (OUTPATIENT)
Dept: FAMILY MEDICINE CLINIC | Facility: CLINIC | Age: 69
End: 2024-07-11
Payer: MEDICARE

## 2024-07-11 VITALS
DIASTOLIC BLOOD PRESSURE: 80 MMHG | OXYGEN SATURATION: 98 % | SYSTOLIC BLOOD PRESSURE: 120 MMHG | RESPIRATION RATE: 16 BRPM | HEART RATE: 68 BPM | WEIGHT: 158.38 LBS | TEMPERATURE: 97 F | BODY MASS INDEX: 28.42 KG/M2 | HEIGHT: 62.5 IN

## 2024-07-11 DIAGNOSIS — Z00.00 WELL ADULT EXAM: Primary | ICD-10-CM

## 2024-07-11 DIAGNOSIS — E78.00 PURE HYPERCHOLESTEROLEMIA: ICD-10-CM

## 2024-07-11 DIAGNOSIS — Z13.31 NEGATIVE DEPRESSION SCREENING: ICD-10-CM

## 2024-07-11 DIAGNOSIS — R79.89 ELEVATED SERUM CREATININE: ICD-10-CM

## 2024-07-11 DIAGNOSIS — F40.240 CLAUSTROPHOBIA: ICD-10-CM

## 2024-07-11 DIAGNOSIS — E55.9 VITAMIN D DEFICIENCY: ICD-10-CM

## 2024-07-11 DIAGNOSIS — E89.0 POSTSURGICAL HYPOTHYROIDISM: ICD-10-CM

## 2024-07-11 DIAGNOSIS — R94.4 DECREASED GFR: ICD-10-CM

## 2024-07-11 RX ORDER — ALPRAZOLAM 0.25 MG/1
0.25 TABLET ORAL NIGHTLY PRN
Qty: 30 TABLET | Refills: 1 | Status: SHIPPED | OUTPATIENT
Start: 2024-07-11

## 2024-07-11 RX ORDER — FENOFIBRATE 160 MG/1
160 TABLET ORAL DAILY
Qty: 30 TABLET | Refills: 2 | Status: SHIPPED | OUTPATIENT
Start: 2024-07-11

## 2024-07-11 RX ORDER — ALPRAZOLAM 0.25 MG/1
0.25 TABLET ORAL NIGHTLY PRN
COMMUNITY
End: 2024-07-11

## 2024-07-11 NOTE — PROGRESS NOTES
Subjective:      Chief Complaint   Patient presents with    Wellness Visit     Medicare Annual Wellness Visit  Had mammogram done 11/2023 at [facility]  Bone density done 2022     HISTORY OF PRESENT ILLNESS  HPI  HPI obtained per patient report.  Dena Glass is a pleasant 68 year old female presenting for a medicare supervisit.   She has been feeling well since her LOV. She has been continuing to work on maintaining her healthy dietary plan and exercising regularly.   She has been traveling over the summer and requests a refill for her xanax prescription, which she takes as needed for claustrophobia associated with flying.    Medicare Hearing Assessment:   Hearing Screening    Screening Method: Whisper Test  Whisper Test Result: Pass               General Health:  In the past six months, have you lost more than 10 pounds without trying?: 2 - No  Has your appetite been poor?: No  Type of Diet: Balanced  How does the patient maintain a good energy level?: Daily Walks;Stretching  How would you describe your daily physical activity?: Light  How would you describe your current health state?: Good  How do you maintain positive mental well-being?: Social Interaction;Puzzles;Games;Visiting Friends;Visiting Family  On a scale of 0 to 10, with 0 being no pain and 10 being severe pain, what is your pain level?: 0 - (None)  In the past six months, have you experienced urine leakage?: 0-No  At any time do you feel concerned for the safety/well-being of yourself and/or your children, in your home or elsewhere?: No  Have you had any immunizations at another office such as Influenza, Hepatitis B, Tetanus, or Pneumococcal?: No    Fall Risk Assessment:   She has been screened for Falls and is low risk.     Cognitive Assessment:   She had a completely normal cognitive assessment - see flowsheet entries     Functional Ability/Status:   Dena Glass has a completely normal functional assessment. See flowsheet for  details.    Depression Screening (PHQ-2/PHQ-9): PHQ-2 SCORE: 0  , done 7/4/2024       Advanced Directives:     She does NOT have a Living Will. [Do you have a living will?: No]  She does NOT have a Power of  for Health Care. [Do you have a healthcare power of ?: No]      PAST PATIENT HISTORY  Past Medical History:    Anxiety    Breast cancer (HCC)    thyroid removed    HLD (hyperlipidemia)    Hot flashes, menopausal    Postsurgical hypothyroidism    Shingles    Vitamin D deficiency     Past Surgical History:   Procedure Laterality Date    Appendectomy  1960    [facility] in [place]    Hysterectomy  2011    due to fibroid    Lumpectomy left  2016    Lumpectomy left  01/18/2023    Thyroidectomy  1992    Goiter       CURRENT MEDICATIONS  Outpatient Medications Marked as Taking for the 7/11/24 encounter (Office Visit) with Meagan Ty MD   Medication Sig Dispense Refill    ALPRAZolam 0.25 MG Oral Tab Take 1 tablet (0.25 mg total) by mouth nightly as needed for Anxiety. 30 tablet 1    Fenofibrate 160 MG Oral Tab Take 1 tablet (160 mg total) by mouth daily. 30 tablet 2    levothyroxine 88 MCG Oral Tab TAKE 1 TABLET(88 MCG) BY MOUTH DAILY 90 tablet 0    SUMAtriptan 25 MG Oral Tab Take 1 tablet (25 mg total) by mouth every 2 (two) hours as needed for Migraine. 30 tablet 0    Calcium Carbonate Antacid 500 MG Oral Chew Tab Chew 1 tablet (500 mg total) by mouth as needed for Heartburn.      Vitamin D3 2000 units Oral Cap Take 1 capsule (2,000 Units total) by mouth daily.      Multiple Vitamins-Minerals (MULTIVITAMIN ADULT OR) Take 1 tablet by mouth daily.         HEALTH MAINTENANCE  Immunization History   Administered Date(s) Administered    Covid-19 Vaccine Moderna 100 mcg/0.5 ml 02/11/2021, 03/11/2021, 10/14/2021    Covid-19 Vaccine Moderna 50 Mcg/0.25 Ml 08/10/2022    Covid-19 Vaccine Moderna Bivalent 50mcg/0.5mL 12+ years 11/26/2022    FLULAVAL 6 months & older 0.5 ml Prefilled syringe (41133) 11/14/2017,  09/25/2019, 10/12/2022    FLUZONE 6 months and older PFS 0.5 ml (33796) 11/08/2016, 11/14/2017, 11/09/2018, 09/25/2019, 10/14/2020    Fluarix 6 Months And Older 0.5 ml prefilled syringe (42863) 11/09/2018    Flucelvax 0.5ml Vaccine 11/09/2018    Fluvirin, 3 Years & >, Im 09/24/2021    Influenza 11/08/2016, 11/14/2017, 11/05/2018, 09/25/2019, 09/24/2021, 10/12/2022, 12/01/2023    Moderna Covid-19 Vaccine 50mcg/0.5ml 12yrs+ (5767-6580) 11/06/2023    TDAP 11/14/2017    Zoster Vaccine Recombinant Adjuvanted (Shingrix) 12/16/2020, 05/05/2021       ALLERGIES AND DRUG REACTIONS  Allergies   Allergen Reactions    Morphine OTHER (SEE COMMENTS)    Penicillins     Sulfa Antibiotics OTHER (SEE COMMENTS)    Adhesive Tape RASH    Latex ITCHING and RASH       Family History   Problem Relation Age of Onset    Heart Disease Father 79    Asthma Father 79    Other (alzheimers) Mother 85    Arthritis Sister     Thyroid disease Other         spouse    High Blood Pressure Other         spouse     Social History     Socioeconomic History    Marital status:    Tobacco Use    Smoking status: Never    Smokeless tobacco: Never   Substance and Sexual Activity    Alcohol use: No     Alcohol/week: 0.0 standard drinks of alcohol    Drug use: No   Other Topics Concern    Caffeine Concern Yes     Comment: 2 cups coffee daily    Exercise Yes     Comment: Walking 40min daily       Review of Systems   All other systems reviewed and are negative.         Objective:      /80   Pulse 68   Temp 97.2 °F (36.2 °C) (Temporal)   Resp 16   Ht 5' 2.5\" (1.588 m)   Wt 158 lb 6 oz (71.8 kg)   SpO2 98%   BMI 28.51 kg/m²   Body mass index is 28.51 kg/m².    Physical Exam  Vitals reviewed.   Constitutional:       General: She is not in acute distress.     Appearance: She is not ill-appearing, toxic-appearing or diaphoretic.   HENT:      Head: Normocephalic and atraumatic.   Eyes:      General: No scleral icterus.        Right eye: No discharge.          Left eye: No discharge.      Extraocular Movements: Extraocular movements intact.      Conjunctiva/sclera: Conjunctivae normal.   Neck:      Comments: No thyroid tissue palpable    Cardiovascular:      Rate and Rhythm: Normal rate and regular rhythm.      Heart sounds: Normal heart sounds.   Pulmonary:      Effort: Pulmonary effort is normal.      Breath sounds: Normal breath sounds.   Abdominal:      General: Abdomen is flat. There is no distension.   Musculoskeletal:      Cervical back: Neck supple. No tenderness.      Right lower leg: No edema.      Left lower leg: No edema.   Lymphadenopathy:      Cervical: No cervical adenopathy.   Neurological:      Mental Status: She is alert and oriented to person, place, and time.   Psychiatric:         Mood and Affect: Mood normal.            Assessment and Plan:      1. Well adult exam (Primary)  2. Negative depression screening  3. Postsurgical hypothyroidism  -     CBC With Differential With Platelet; Future; Expected date: 01/11/2025  -     Comp Metabolic Panel (14); Future; Expected date: 01/11/2025  -     TSH W Reflex To Free T4; Future; Expected date: 01/11/2025  4. Pure hypercholesterolemia  -     Fenofibrate; Take 1 tablet (160 mg total) by mouth daily.  Dispense: 30 tablet; Refill: 2  -     CBC With Differential With Platelet; Future; Expected date: 01/11/2025  -     Comp Metabolic Panel (14); Future; Expected date: 01/11/2025  -     Lipid Panel; Future; Expected date: 01/11/2025  5. Decreased GFR  -     Comp Metabolic Panel (14); Future; Expected date: 01/11/2025  6. Elevated serum creatinine  -     Comp Metabolic Panel (14); Future; Expected date: 01/11/2025  7. Vitamin D deficiency  -     Vitamin D; Future; Expected date: 01/11/2025  8. Claustrophobia  -     ALPRAZolam; Take 1 tablet (0.25 mg total) by mouth nightly as needed for Anxiety.  Dispense: 30 tablet; Refill: 1    Return in about 6 months (around 1/11/2025) for follow-up.    Medicare Wellness  Visit  - she is doing well  - get up and go test <10 seconds: yes  - she is UTD on her mammogram, DEXA scan, and colonoscopy; completed 11/2023, 2022, and 2018 respectively at [facility]. We will request these results  - she is UTD on her immunizations    Hypothyroidism  - TSH is WNL  - we will continue her levothyroxine and monitor her TFTs in 6 months    Hypercholesterolemia  - she was unable to tolerate pravastatin 40 mg, so her dose was reduced to 20 mg  - her cholesterol levels are unfortunately only minimally improved compared to her previous  - we will discontinue pravastatin and try fenofibrate instead, then plan to recheck her levels in 6 months    Borderline low GFR and elevated Cr  - recommended increasing her water intake   - we will monitor in 6 months      Patient verbalized understanding of assessment and recommendations. All questions and concerns were addressed.    Electronically signed by Meagan Ty MD

## 2024-07-16 ENCOUNTER — TELEPHONE (OUTPATIENT)
Dept: FAMILY MEDICINE CLINIC | Facility: CLINIC | Age: 69
End: 2024-07-16

## 2024-07-16 NOTE — TELEPHONE ENCOUNTER
Received fax from Dr. Gonzalez.  Received mammogram and dexa scan.  Reports have abstracted.  HM updated.

## 2024-07-18 ENCOUNTER — TELEPHONE (OUTPATIENT)
Dept: FAMILY MEDICINE CLINIC | Facility: CLINIC | Age: 69
End: 2024-07-18

## 2024-07-18 NOTE — TELEPHONE ENCOUNTER
Faxed records from [facility] reviewed: DEXA scan 3/2023 showed osteopenia in the lumbar spine and proximal femur. Will plan to repeat in 2-5 years.   Mammogram 12/2023 was benign.

## 2024-08-05 ENCOUNTER — PATIENT MESSAGE (OUTPATIENT)
Dept: FAMILY MEDICINE CLINIC | Facility: CLINIC | Age: 69
End: 2024-08-05

## 2024-08-06 NOTE — TELEPHONE ENCOUNTER
From: Dena Glass  To: Meagan Ty  Sent: 8/5/2024 12:42 PM CDT  Subject: Fenofibrate    Molly Underwood! I am sending you this message because the Fenofibrate has given me severe upper stomach pains. I have been taking it for 3 weeks and the pain seems to be getting worse. I have been eating with the medicine, and at night have pain and get something to eat. I also have diarrhea. Thank you, Dena Glass

## 2024-08-22 ENCOUNTER — PATIENT MESSAGE (OUTPATIENT)
Dept: FAMILY MEDICINE CLINIC | Facility: CLINIC | Age: 69
End: 2024-08-22

## 2024-08-22 NOTE — TELEPHONE ENCOUNTER
From: Dena Glass  To: Meagan Ty  Sent: 8/22/2024 9:33 AM CDT  Subject: High liver enzymes     Hello Dr. Ty, I did blood work for my oncologist last week. The results were Alkaline Phosphatase 178, AST 65, ALT 96. I feel that the Fenofibrate increased my numbers. I will be doing a bone scan and CT with contrast, and more blood work. Do you think that my numbers could have raised from the medicine that I finished August 5th? What are your thoughts? My oncologist is Dr. Carmita Gonzalez at [facility].

## 2024-09-02 DIAGNOSIS — E89.0 POSTSURGICAL HYPOTHYROIDISM: ICD-10-CM

## 2024-09-03 RX ORDER — LEVOTHYROXINE SODIUM 88 UG/1
88 TABLET ORAL DAILY
Qty: 90 TABLET | Refills: 0 | Status: SHIPPED | OUTPATIENT
Start: 2024-09-03

## 2024-09-03 NOTE — TELEPHONE ENCOUNTER
Requested Renewals     Name from pharmacy: LEVOTHYROXINE 0.088MG (88MCG) TAB         Will file in chart as: LEVOTHYROXINE 88 MCG Oral Tab    Sig: TAKE 1 TABLET(88 MCG) BY MOUTH DAILY    Disp: 90 tablet    Refills: 0 (Pharmacy requested: Not specified)    Start: 9/2/2024    Class: Normal    Non-formulary For: Postsurgical hypothyroidism    Last ordered: 3 months ago (6/4/2024) by Meagan Ty MD    Last refill: 6/4/2024    Rx #: [id number]    Thyroid Medication Protocol [id number]/02/2024 06:02 AM   Protocol Details TSH in past 12 months    Last TSH value is normal    In person appointment or virtual visit in the past 12 mos or appointment in next 3 mos             No future appointments.  LOV: 7/11/24 for wellness visit  RTC: 6 months  Labs done 7/3/24    RX sent.

## 2024-12-01 DIAGNOSIS — E89.0 POSTSURGICAL HYPOTHYROIDISM: ICD-10-CM

## 2024-12-02 RX ORDER — LEVOTHYROXINE SODIUM 88 UG/1
88 TABLET ORAL DAILY
Qty: 90 TABLET | Refills: 0 | Status: SHIPPED | OUTPATIENT
Start: 2024-12-02

## 2024-12-02 NOTE — TELEPHONE ENCOUNTER
Requested Renewals     Name from pharmacy: LEVOTHYROXINE 0.088MG (88MCG) TAB         Will file in chart as: LEVOTHYROXINE 88 MCG Oral Tab    Sig: TAKE 1 TABLET(88 MCG) BY MOUTH DAILY    Disp: 90 tablet    Refills: 0 (Pharmacy requested: Not specified)    Start: 12/1/2024    Class: Normal    Non-formulary For: Postsurgical hypothyroidism    Last ordered: 3 months ago (9/3/2024) by Meagan Ty MD    Last refill: 9/3/2024    Rx #: [id number]    Thyroid Medication Protocol [id number]/01/2024 06:07 AM   Protocol Details TSH in past 12 months    Last TSH value is normal    In person appointment or virtual visit in the past 12 mos or appointment in next 3 mos             No future appointments.  LOV: 7/11/24 wellness visit  RTC: 6 months

## 2025-02-26 DIAGNOSIS — E89.0 POSTSURGICAL HYPOTHYROIDISM: ICD-10-CM

## 2025-02-28 RX ORDER — LEVOTHYROXINE SODIUM 88 UG/1
88 TABLET ORAL DAILY
Qty: 90 TABLET | Refills: 1 | Status: SHIPPED | OUTPATIENT
Start: 2025-02-28

## 2025-02-28 NOTE — TELEPHONE ENCOUNTER
Requested Renewals     Name from pharmacy: LEVOTHYROXINE 0.088MG (88MCG) TAB         Will file in chart as: LEVOTHYROXINE 88 MCG Oral Tab    Sig: TAKE 1 TABLET(88 MCG) BY MOUTH DAILY    Disp: 90 tablet    Refills: 0 (Pharmacy requested: Not specified)    Start: 2/26/2025    Class: Normal    Non-formulary For: Postsurgical hypothyroidism    Last ordered: 2 months ago (12/2/2024) by Meagan yT MD    Last refill: 12/2/2024    Rx #: [id number]    Thyroid Medication Protocol [id number]/26/2025 06:00 AM   Protocol Details TSH in past 12 months    Last TSH value is normal    In person appointment or virtual visit in the past 12 mos or appointment in next 3 mos    Medication is active on med list             No future appointments.  LOV: 7/11/24 for wellness visit  RTC: 6 months  RX sent per protocol.

## 2025-07-02 ENCOUNTER — TELEPHONE (OUTPATIENT)
Dept: FAMILY MEDICINE CLINIC | Facility: CLINIC | Age: 70
End: 2025-07-02

## 2025-07-02 DIAGNOSIS — E89.0 POSTSURGICAL HYPOTHYROIDISM: ICD-10-CM

## 2025-07-02 DIAGNOSIS — Z00.00 WELL ADULT EXAM: Primary | ICD-10-CM

## 2025-07-02 DIAGNOSIS — E55.9 VITAMIN D DEFICIENCY: ICD-10-CM

## 2025-07-02 NOTE — TELEPHONE ENCOUNTER
Orders placed.   MCM sent to patient.     Future Appointments   Date Time Provider Department Center   7/24/2025 11:30 AM Meagan Ty MD [facility] [address] [facility] [address]

## 2025-07-09 ENCOUNTER — LAB ENCOUNTER (OUTPATIENT)
Dept: LAB | Age: 70
End: 2025-07-09
Attending: STUDENT IN AN ORGANIZED HEALTH CARE EDUCATION/TRAINING PROGRAM
Payer: MEDICARE

## 2025-07-09 DIAGNOSIS — E89.0 POSTSURGICAL HYPOTHYROIDISM: ICD-10-CM

## 2025-07-09 DIAGNOSIS — Z00.00 WELL ADULT EXAM: ICD-10-CM

## 2025-07-09 DIAGNOSIS — E55.9 VITAMIN D DEFICIENCY: ICD-10-CM

## 2025-07-09 LAB
ALBUMIN SERPL-MCNC: 4.6 G/DL (ref 3.2–4.8)
ALBUMIN/GLOB SERPL: 1.7 {RATIO} (ref 1–2)
ALP LIVER SERPL-CCNC: 62 U/L (ref 55–142)
ALT SERPL-CCNC: 15 U/L (ref 10–49)
ANION GAP SERPL CALC-SCNC: 4 MMOL/L (ref 0–18)
AST SERPL-CCNC: 23 U/L (ref ?–34)
BASOPHILS # BLD AUTO: 0.02 X10(3) UL (ref 0–0.2)
BASOPHILS NFR BLD AUTO: 0.6 %
BILIRUB SERPL-MCNC: 0.6 MG/DL (ref 0.2–1.1)
BUN BLD-MCNC: 15 MG/DL (ref 9–23)
CALCIUM BLD-MCNC: 9.9 MG/DL (ref 8.7–10.6)
CHLORIDE SERPL-SCNC: 102 MMOL/L (ref 98–112)
CHOLEST SERPL-MCNC: 302 MG/DL (ref ?–200)
CO2 SERPL-SCNC: 32 MMOL/L (ref 21–32)
CREAT BLD-MCNC: 0.94 MG/DL (ref 0.55–1.02)
EGFRCR SERPLBLD CKD-EPI 2021: 67 ML/MIN/1.73M2 (ref 60–?)
EOSINOPHIL # BLD AUTO: 0.11 X10(3) UL (ref 0–0.7)
EOSINOPHIL NFR BLD AUTO: 3.3 %
ERYTHROCYTE [DISTWIDTH] IN BLOOD BY AUTOMATED COUNT: 13.2 %
FASTING PATIENT LIPID ANSWER: YES
FASTING STATUS PATIENT QL REPORTED: YES
GLOBULIN PLAS-MCNC: 2.7 G/DL (ref 2–3.5)
GLUCOSE BLD-MCNC: 80 MG/DL (ref 70–99)
HCT VFR BLD AUTO: 39.4 % (ref 35–48)
HDLC SERPL-MCNC: 86 MG/DL (ref 40–59)
HGB BLD-MCNC: 13.1 G/DL (ref 12–16)
IMM GRANULOCYTES # BLD AUTO: 0.01 X10(3) UL (ref 0–1)
IMM GRANULOCYTES NFR BLD: 0.3 %
LDLC SERPL CALC-MCNC: 207 MG/DL (ref ?–100)
LYMPHOCYTES # BLD AUTO: 1.5 X10(3) UL (ref 1–4)
LYMPHOCYTES NFR BLD AUTO: 44.4 %
MCH RBC QN AUTO: 29.6 PG (ref 26–34)
MCHC RBC AUTO-ENTMCNC: 33.2 G/DL (ref 31–37)
MCV RBC AUTO: 88.9 FL (ref 80–100)
MONOCYTES # BLD AUTO: 0.37 X10(3) UL (ref 0.1–1)
MONOCYTES NFR BLD AUTO: 10.9 %
NEUTROPHILS # BLD AUTO: 1.37 X10 (3) UL (ref 1.5–7.7)
NEUTROPHILS # BLD AUTO: 1.37 X10(3) UL (ref 1.5–7.7)
NEUTROPHILS NFR BLD AUTO: 40.5 %
NONHDLC SERPL-MCNC: 216 MG/DL (ref ?–130)
OSMOLALITY SERPL CALC.SUM OF ELEC: 286 MOSM/KG (ref 275–295)
PLATELET # BLD AUTO: 220 10(3)UL (ref 150–450)
POTASSIUM SERPL-SCNC: 4.3 MMOL/L (ref 3.5–5.1)
PROT SERPL-MCNC: 7.3 G/DL (ref 5.7–8.2)
RBC # BLD AUTO: 4.43 X10(6)UL (ref 3.8–5.3)
SODIUM SERPL-SCNC: 138 MMOL/L (ref 136–145)
TRIGL SERPL-MCNC: 62 MG/DL (ref 30–149)
TSI SER-ACNC: 1.06 UIU/ML (ref 0.55–4.78)
VIT D+METAB SERPL-MCNC: 43.6 NG/ML (ref 30–100)
VLDLC SERPL CALC-MCNC: 13 MG/DL (ref 0–30)
WBC # BLD AUTO: 3.4 X10(3) UL (ref 4–11)

## 2025-07-09 PROCEDURE — 84443 ASSAY THYROID STIM HORMONE: CPT

## 2025-07-09 PROCEDURE — 80053 COMPREHEN METABOLIC PANEL: CPT

## 2025-07-09 PROCEDURE — 36415 COLL VENOUS BLD VENIPUNCTURE: CPT

## 2025-07-09 PROCEDURE — 82306 VITAMIN D 25 HYDROXY: CPT

## 2025-07-09 PROCEDURE — 80061 LIPID PANEL: CPT

## 2025-07-09 PROCEDURE — 85025 COMPLETE CBC W/AUTO DIFF WBC: CPT

## 2025-07-24 ENCOUNTER — OFFICE VISIT (OUTPATIENT)
Dept: FAMILY MEDICINE CLINIC | Facility: CLINIC | Age: 70
End: 2025-07-24
Payer: MEDICARE

## 2025-07-24 VITALS
SYSTOLIC BLOOD PRESSURE: 120 MMHG | DIASTOLIC BLOOD PRESSURE: 80 MMHG | HEART RATE: 72 BPM | OXYGEN SATURATION: 99 % | HEIGHT: 62.5 IN | WEIGHT: 155.13 LBS | BODY MASS INDEX: 27.84 KG/M2 | TEMPERATURE: 97 F | RESPIRATION RATE: 16 BRPM

## 2025-07-24 DIAGNOSIS — E78.00 PURE HYPERCHOLESTEROLEMIA: ICD-10-CM

## 2025-07-24 DIAGNOSIS — Z00.00 WELL ADULT EXAM: Primary | ICD-10-CM

## 2025-07-24 DIAGNOSIS — F51.01 PRIMARY INSOMNIA: ICD-10-CM

## 2025-07-24 DIAGNOSIS — E89.0 POSTSURGICAL HYPOTHYROIDISM: ICD-10-CM

## 2025-07-24 DIAGNOSIS — M85.88 OSTEOPENIA OF LUMBAR SPINE: ICD-10-CM

## 2025-07-24 DIAGNOSIS — Z13.31 NEGATIVE DEPRESSION SCREENING: ICD-10-CM

## 2025-07-24 RX ORDER — EZETIMIBE 10 MG/1
10 TABLET ORAL NIGHTLY
Refills: 0 | Status: CANCELLED | OUTPATIENT
Start: 2025-07-24

## 2025-07-24 RX ORDER — ROSUVASTATIN CALCIUM 5 MG/1
5 TABLET, COATED ORAL NIGHTLY
Qty: 30 TABLET | Refills: 3 | Status: SHIPPED | OUTPATIENT
Start: 2025-07-24

## 2025-07-24 RX ORDER — LEVOTHYROXINE SODIUM 88 UG/1
88 TABLET ORAL DAILY
Qty: 90 TABLET | Refills: 1 | Status: SHIPPED | OUTPATIENT
Start: 2025-07-24

## 2025-07-24 NOTE — PROGRESS NOTES
Subjective:      Chief Complaint   Patient presents with    Wellness Visit     Medicare Annual Wellness Visit - had recent labs done - mammogram done at [facility] the [facility] on 11/2024 -> records released signed to get results    Immunization/Injection     Pt would like to discuss pneumo vaccine today     HISTORY OF PRESENT ILLNESS  HPI  HPI obtained per patient report.  Dena Glass is a pleasant 69 year old female presenting for her medicare supervisit.     She reports chronic nightly difficulty falling asleep. She has tried melatonin without improvement.      Medicare Hearing Assessment:   Hearing Screening    Time taken: 7/24/2025 12:04 PM  Entry User: Meagan Ty MD  Screening Method: Other, Whisper Test  Whisper Test Result: Pass               General Health:  In the past six months, have you lost more than 10 pounds without trying?: (Patient-Rptd) 2 - No  Has your appetite been poor?: (Patient-Rptd) No  Type of Diet: (Patient-Rptd) Balanced  How does the patient maintain a good energy level?: (Patient-Rptd) Daily Walks, Stretching  How would you describe your daily physical activity?: (Patient-Rptd) Light  How would you describe your current health state?: (Patient-Rptd) Fair  How do you maintain positive mental well-being?: (Patient-Rptd) Puzzles, Visiting Family  On a scale of 0 to 10, with 0 being no pain and 10 being severe pain, what is your pain level?: (Patient-Rptd) 0 - (None)  In the past six months, have you experienced urine leakage?: (Patient-Rptd) 0-No  At any time do you feel concerned for the safety/well-being of yourself and/or your children, in your home or elsewhere?: (Patient-Rptd) No  Have you had any immunizations at another office such as Influenza, Hepatitis B, Tetanus, or Pneumococcal?: (Patient-Rptd) Yes    Fall Risk Assessment:   She has been screened for Falls and is low risk.     Cognitive Assessment:   She had a completely normal cognitive assessment - see  flowsheet entries     Functional Ability/Status:   Dena Glass has a completely normal functional assessment. See flowsheet for details.    Depression Screening (PHQ-2/PHQ-9): PHQ-2 SCORE: 0  , done 7/24/2025   Last [place] Suicide Screening on 7/24/2025 was No Risk.    Advanced Directives:     She does NOT have a Living Will. [Do you have a living will?: (Patient-Rptd) No]  She does NOT have a Power of  for Health Care. [Do you have a healthcare power of ?: (Patient-Rptd) No]      PAST PATIENT HISTORY  Past Medical History[1]  Past Surgical History[2]    CURRENT MEDICATIONS  Medications Taking[3]    HEALTH MAINTENANCE  Immunization History   Administered Date(s) Administered    Covid-19 Vaccine Moderna 100 mcg/0.5 ml 02/11/2021, 03/11/2021, 10/14/2021    Covid-19 Vaccine Moderna 50 Mcg/0.25 Ml 08/10/2022    Covid-19 Vaccine Moderna Bivalent 50mcg/0.5mL 12+ years 11/26/2022    FLULAVAL 6 months & older 0.5 ml Prefilled syringe (85026) 11/14/2017, 09/25/2019, 10/12/2022    FLUZONE 6 months and older PFS 0.5 ml (36642) 11/08/2016, 11/14/2017, 11/09/2018, 09/25/2019, 10/14/2020    Fluarix 6 Months And Older 0.5 ml prefilled syringe (84134) 11/09/2018    Flucelvax Influenza vaccine, trivalent (ccIIV3), 0.5mL IM 11/09/2018, 09/26/2024    Fluvirin, 3 Years & >, Im 09/24/2021    Influenza 11/08/2016, 11/14/2017, 11/05/2018, 09/25/2019, 09/24/2021, 10/12/2022, 12/01/2023    Moderna Covid-19 Vaccine 50mcg/0.5ml 12yrs+ 11/06/2023, 10/25/2024    TDAP 11/14/2017    Zoster Vaccine Recombinant Adjuvanted (Shingrix) 12/16/2020, 05/05/2021       ALLERGIES AND DRUG REACTIONS  Allergies[4]    Family History[5]  Short Social Hx on File[6]    Review of Systems   Psychiatric/Behavioral:  Positive for sleep disturbance.    All other systems reviewed and are negative.         Objective:      /80   Pulse 72   Temp 97.2 °F (36.2 °C) (Temporal)   Resp 16   Ht 5' 2.5\" (1.588 m)   Wt 155 lb 2 oz (70.4 kg)    SpO2 99%   BMI 27.92 kg/m²   Body mass index is 27.92 kg/m².    Physical Exam  Vitals reviewed.   Constitutional:       General: She is not in acute distress.     Appearance: She is not ill-appearing, toxic-appearing or diaphoretic.   HENT:      Head: Normocephalic and atraumatic.   Eyes:      General: No scleral icterus.        Right eye: No discharge.         Left eye: No discharge.      Extraocular Movements: Extraocular movements intact.      Conjunctiva/sclera: Conjunctivae normal.   Neck:      Comments: No thyroid tissue palpable    Cardiovascular:      Rate and Rhythm: Normal rate and regular rhythm.      Heart sounds: Normal heart sounds.   Pulmonary:      Effort: Pulmonary effort is normal.      Breath sounds: Normal breath sounds.   Abdominal:      General: Abdomen is flat. There is no distension.   Musculoskeletal:      Cervical back: Neck supple. No tenderness.      Right lower leg: No edema.      Left lower leg: No edema.   Lymphadenopathy:      Cervical: No cervical adenopathy.   Neurological:      Mental Status: She is alert and oriented to person, place, and time.   Psychiatric:         Mood and Affect: Mood normal.            Assessment and Plan:      1. Well adult exam (Primary)  2. Osteopenia of lumbar spine  -     XR DEXA BONE DENSITOMETRY (CPT=77080); Future; Expected date: 07/24/2025  3. Primary insomnia  4. Postsurgical hypothyroidism  -     Levothyroxine Sodium; Take 1 tablet (88 mcg total) by mouth daily.  Dispense: 90 tablet; Refill: 1  5. Pure hypercholesterolemia  -     Rosuvastatin Calcium; Take 1 tablet (5 mg total) by mouth nightly.  Dispense: 30 tablet; Refill: 3  -     Comp Metabolic Panel (14); Future; Expected date: 07/24/2025  -     Lipid Panel; Future; Expected date: 07/24/2025  6. Negative depression screening    Return in about 2 months (around 9/24/2025) for follow-up.    Medicare Wellness Visit  - get up and go test <10 seconds: yes  - mammogram completed 11/2024 at [facility]; we will request these records  - due for DEXA scan. Most recent was completed in 2023 and showed osteopenia   - colonoscopy benign 2018 at [facility]   - recommended PCV20 vaccine; she prefers to have this administered at her local pharmacy      Insomnia  - we discussed options for treatment including magnesium glycinate and trazodone  - she declined prescription pharmacotherapy at this time and prefers to try magnesium glycinate supplement     Hypothyroidism  - TSH is WNL  - we will continue her levothyroxine and monitor her TFTs in 6 months     Hypercholesterolemia  - severely elevated   - pravastatin 20 mg did not provide significant improvement of her levels, and she was unable to tolerate 40 mg  - she was unable to tolerate fenofibrate  - recommended trying rosuvastatin 5 mg nightly. If tolerates well without any issues, recommended increasing dose to 10 mg nightly after a few weeks  - recommended rechecking lipid panel in 2 months        Patient verbalized understanding of assessment and recommendations. All questions and concerns were addressed.    Electronically signed by Meagan Ty MD         [1]   Past Medical History:   Anxiety    Breast cancer (HCC)    thyroid removed    HLD (hyperlipidemia)    Hot flashes, menopausal    Osteopenia    Postsurgical hypothyroidism    Shingles    Vitamin D deficiency   [2]   Past Surgical History:  Procedure Laterality Date    Appendectomy  1960    [facility] in [place]    Hysterectomy  2011    due to fibroid    Lumpectomy left  2016    Lumpectomy left  01/18/2023    Thyroidectomy  1992    Goiter   [3]   Outpatient Medications Marked as Taking for the 7/24/25 encounter (Office Visit) with Meagan Ty MD   Medication Sig Dispense Refill    levothyroxine 88 MCG Oral Tab Take 1 tablet (88 mcg total) by mouth daily. 90 tablet 1    rosuvastatin 5 MG Oral Tab Take 1 tablet (5 mg total) by mouth nightly. 30 tablet 3    ALPRAZolam 0.25 MG Oral Tab Take 1 tablet (0.25 mg total)  by mouth nightly as needed for Anxiety. 30 tablet 1    SUMAtriptan 25 MG Oral Tab Take 1 tablet (25 mg total) by mouth every 2 (two) hours as needed for Migraine. 30 tablet 0    Calcium Carbonate Antacid 500 MG Oral Chew Tab Chew 1 tablet (500 mg total) by mouth as needed for Heartburn.      Vitamin D3 2000 units Oral Cap Take 1 capsule (2,000 Units total) by mouth daily.      Multiple Vitamins-Minerals (MULTIVITAMIN ADULT OR) Take 1 tablet by mouth daily.     [4]   Allergies  Allergen Reactions    Morphine OTHER (SEE COMMENTS)    Penicillins     Sulfa Antibiotics OTHER (SEE COMMENTS)    Adhesive Tape RASH    Latex ITCHING and RASH   [5]   Family History  Problem Relation Age of Onset    Heart Disease Father 79    Asthma Father 79    Other (alzheimers) Mother 85    Dementia Mother     Arthritis Sister     Thyroid disease Other         spouse    High Blood Pressure Other         spouse   [6]   Social History  Socioeconomic History    Marital status:    Tobacco Use    Smoking status: Never    Smokeless tobacco: Never   Substance and Sexual Activity    Alcohol use: No    Drug use: No   Other Topics Concern    Caffeine Concern No    Stress Concern No    Weight Concern No    Special Diet No    Exercise No    Seat Belt No

## 2025-07-28 ENCOUNTER — TELEPHONE (OUTPATIENT)
Dept: FAMILY MEDICINE CLINIC | Facility: CLINIC | Age: 70
End: 2025-07-28

## 2025-07-28 NOTE — TELEPHONE ENCOUNTER
Received mammogram report from [facility].  Report has been abstracted and placed in MD bin for review.

## 2025-07-31 ENCOUNTER — PATIENT MESSAGE (OUTPATIENT)
Dept: FAMILY MEDICINE CLINIC | Facility: CLINIC | Age: 70
End: 2025-07-31

## 2025-08-27 DIAGNOSIS — E89.0 POSTSURGICAL HYPOTHYROIDISM: ICD-10-CM

## 2025-08-27 RX ORDER — LEVOTHYROXINE SODIUM 88 UG/1
88 TABLET ORAL DAILY
Qty: 90 TABLET | Refills: 1 | OUTPATIENT
Start: 2025-08-27

## (undated) DIAGNOSIS — E78.00 HIGH CHOLESTEROL: ICD-10-CM

## (undated) NOTE — LETTER
10/16/17        Krishna Guzmán W Sandeep      Dear Myesha Pulling,    [facility] records indicate that you have outstanding lab work and or testing that was ordered for you and has not yet been completed:          TSH and Fr

## (undated) NOTE — LETTER
[facility], [address] [address] [facility] [address] [facility] [address] [facility] [address]  [phone]            January 12, 2018      [address] [facility] [address]      Dear Catrachita Ledbetter:     Our rec

## (undated) NOTE — LETTER
Patient Name: Dilip Negron  YOB: 1955          MRN number:  [id number]  Date:  8/14/2017  Referring Physician:  Cecy Valiente    Discharge Summary  Initial Functional Outcome Score 28/100  Final Functional Outcome Score 98/100  Numb this plan of treatment and while under my care.     X___________________________________________________ Date____________________    Certification From:  8/15/2017   To: 8/15/2017

## (undated) NOTE — Clinical Note
02/17/2017        [address]      Dear Kemar Trujillo,    [address] records indicate that you have outstanding lab work and or testing that was ordered for you and has not yet been completed:      Thyroid p

## (undated) NOTE — LETTER
Date: 9/25/2019    Patient Name: Veronica Kingston          To Whom it may concern: This letter has been written at the patient's request. Grovercisco Jannie has received flulaval influenza 0.5 ml vaccine today in our office.          Sincerely,    Kacie Mendoza

## (undated) NOTE — MR AVS SNAPSHOT
[facility] [address] [place] [address]  Michael Smith [id number] [phone]               Thank you for choosing us for your health care visit with Rita Quiros DO.   We are glad to serve you and happy to provide you wi cold or flu to turn into bronchitis. These include being very young or very old or having a heart or lung problem. Cigarette smoking also makes bronchitis more likely. When bronchitis develops, the airways become swollen.  The airways may also become infec (38.0°C) higher  · Symptoms that get worse, or new symptoms  · Trouble breathing  · Symptoms that don’t start to improve within a week, or within 3 days of taking antibiotics   Date Last Reviewed: 8/4/2014  © 3244-2360 [employer] [address] sinusitis  Treatment is aimed at unblocking the sinus opening and helping the cilia work again. You may need to take antihistamine and decongestant medicine. These can reduce inflammation and decrease the amount of fluid your sinuses make.  If you have a ba Get Your Medications      These medications were sent to [facility] [address], [address] [facility] AT [address] [place] [address], [phone], [phone]  [address], [address] [facility] [address]     Phone:  [phone] - azith

## (undated) NOTE — MR AVS SNAPSHOT
[address] [place] [address] 700 [facility]  [address] [id number] [phone]               Thank you for choosing us for your health care visit with Polly Razo DO.   We are glad to serve you and happy to provide you wi the thyroid gland. When you have certain treatments, such as surgery to remove the thyroid gland, this can also cause hypothyroidism.   Symptoms of hypothyroidism can include:  · Fatigue  · Trouble concentrating or thinking clearly; forgetfulness  · Dry ski your blood.   When to seek medical advice  Call your healthcare provider right away if any of these occur:  · New symptoms develop  · Symptoms return, continue, or worsen even after treatment  · Extreme fatigue  · Puffy hands, face, or feet  · Fast or irreg more information, go to [email or web address] and click on the Sign Up Now link in the [employer] box. Enter your [employer] Activation Code exactly as it appears below along with your Zip Code and Date of Birth to complete the sign-up process.  If yo You don’t need to join a gym. Home exercises work great.  Put more priority on exercise in your life                    Visit [facility] online at  [email or web address]